# Patient Record
Sex: MALE | Race: WHITE | NOT HISPANIC OR LATINO | Employment: OTHER | URBAN - METROPOLITAN AREA
[De-identification: names, ages, dates, MRNs, and addresses within clinical notes are randomized per-mention and may not be internally consistent; named-entity substitution may affect disease eponyms.]

---

## 2018-11-26 PROCEDURE — 96366 THER/PROPH/DIAG IV INF ADDON: CPT

## 2018-11-26 PROCEDURE — 99285 EMERGENCY DEPT VISIT HI MDM: CPT | Mod: 25

## 2018-11-26 PROCEDURE — 96365 THER/PROPH/DIAG IV INF INIT: CPT

## 2018-11-26 PROCEDURE — 96375 TX/PRO/DX INJ NEW DRUG ADDON: CPT

## 2018-11-26 PROCEDURE — 99284 EMERGENCY DEPT VISIT MOD MDM: CPT | Mod: ,,, | Performed by: PHYSICIAN ASSISTANT

## 2018-11-27 ENCOUNTER — ANESTHESIA (OUTPATIENT)
Dept: ENDOSCOPY | Facility: HOSPITAL | Age: 43
End: 2018-11-27
Payer: MEDICAID

## 2018-11-27 ENCOUNTER — ANESTHESIA EVENT (OUTPATIENT)
Dept: ENDOSCOPY | Facility: HOSPITAL | Age: 43
End: 2018-11-27
Payer: MEDICAID

## 2018-11-27 ENCOUNTER — HOSPITAL ENCOUNTER (OUTPATIENT)
Facility: HOSPITAL | Age: 43
Discharge: PSYCHIATRIC OR SUBSTANCE USE FACILITY | End: 2018-11-29
Attending: EMERGENCY MEDICINE | Admitting: HOSPITALIST
Payer: MEDICAID

## 2018-11-27 DIAGNOSIS — T14.91XA SUICIDE ATTEMPT: Primary | ICD-10-CM

## 2018-11-27 DIAGNOSIS — F31.9 BIPOLAR AFFECTIVE DISORDER, REMISSION STATUS UNSPECIFIED: ICD-10-CM

## 2018-11-27 DIAGNOSIS — T54.3X2A: ICD-10-CM

## 2018-11-27 DIAGNOSIS — T30.4 CHEMICAL BURN: ICD-10-CM

## 2018-11-27 DIAGNOSIS — T50.905A ADVERSE EFFECT OF DRUG, INITIAL ENCOUNTER: ICD-10-CM

## 2018-11-27 DIAGNOSIS — F31.4 BIPOLAR I DISORDER, MOST RECENT EPISODE DEPRESSED, SEVERE WITHOUT PSYCHOTIC FEATURES: ICD-10-CM

## 2018-11-27 PROBLEM — F32.A DEPRESSION: Status: ACTIVE | Noted: 2018-11-27

## 2018-11-27 PROBLEM — S09.93XA: Status: ACTIVE | Noted: 2018-11-27

## 2018-11-27 LAB
ALBUMIN SERPL BCP-MCNC: 3.8 G/DL
ALBUMIN SERPL BCP-MCNC: 4 G/DL
ALP SERPL-CCNC: 70 U/L
ALP SERPL-CCNC: 87 U/L
ALT SERPL W/O P-5'-P-CCNC: 14 U/L
ALT SERPL W/O P-5'-P-CCNC: 15 U/L
AMPHET+METHAMPHET UR QL: NEGATIVE
ANION GAP SERPL CALC-SCNC: 10 MMOL/L
ANION GAP SERPL CALC-SCNC: 6 MMOL/L
APAP SERPL-MCNC: 4 UG/ML
AST SERPL-CCNC: 25 U/L
AST SERPL-CCNC: 28 U/L
BACTERIA #/AREA URNS AUTO: ABNORMAL /HPF
BARBITURATES UR QL SCN>200 NG/ML: NEGATIVE
BASOPHILS # BLD AUTO: 0.04 K/UL
BASOPHILS # BLD AUTO: 0.06 K/UL
BASOPHILS NFR BLD: 0.2 %
BASOPHILS NFR BLD: 0.8 %
BENZODIAZ UR QL SCN>200 NG/ML: NORMAL
BILIRUB SERPL-MCNC: 0.3 MG/DL
BILIRUB SERPL-MCNC: 0.4 MG/DL
BILIRUB UR QL STRIP: ABNORMAL
BUN SERPL-MCNC: 23 MG/DL
BUN SERPL-MCNC: 26 MG/DL
BZE UR QL SCN: NEGATIVE
CALCIUM SERPL-MCNC: 9.3 MG/DL
CALCIUM SERPL-MCNC: 9.5 MG/DL
CANNABINOIDS UR QL SCN: NEGATIVE
CHLORIDE SERPL-SCNC: 106 MMOL/L
CHLORIDE SERPL-SCNC: 108 MMOL/L
CLARITY UR REFRACT.AUTO: ABNORMAL
CO2 SERPL-SCNC: 27 MMOL/L
CO2 SERPL-SCNC: 28 MMOL/L
COLOR UR AUTO: ABNORMAL
CREAT SERPL-MCNC: 1 MG/DL
CREAT SERPL-MCNC: 1.1 MG/DL
CREAT UR-MCNC: 363 MG/DL
DIFFERENTIAL METHOD: ABNORMAL
DIFFERENTIAL METHOD: ABNORMAL
EOSINOPHIL # BLD AUTO: 0.2 K/UL
EOSINOPHIL # BLD AUTO: 0.2 K/UL
EOSINOPHIL NFR BLD: 1 %
EOSINOPHIL NFR BLD: 2.8 %
ERYTHROCYTE [DISTWIDTH] IN BLOOD BY AUTOMATED COUNT: 12.8 %
ERYTHROCYTE [DISTWIDTH] IN BLOOD BY AUTOMATED COUNT: 12.8 %
EST. GFR  (AFRICAN AMERICAN): >60 ML/MIN/1.73 M^2
EST. GFR  (AFRICAN AMERICAN): >60 ML/MIN/1.73 M^2
EST. GFR  (NON AFRICAN AMERICAN): >60 ML/MIN/1.73 M^2
EST. GFR  (NON AFRICAN AMERICAN): >60 ML/MIN/1.73 M^2
ETHANOL SERPL-MCNC: <10 MG/DL
GLUCOSE SERPL-MCNC: 113 MG/DL
GLUCOSE SERPL-MCNC: 120 MG/DL
GLUCOSE UR QL STRIP: NEGATIVE
HCT VFR BLD AUTO: 41.8 %
HCT VFR BLD AUTO: 42.5 %
HGB BLD-MCNC: 14.1 G/DL
HGB BLD-MCNC: 14.2 G/DL
HGB UR QL STRIP: NEGATIVE
HYALINE CASTS UR QL AUTO: 6 /LPF
IMM GRANULOCYTES # BLD AUTO: 0.01 K/UL
IMM GRANULOCYTES # BLD AUTO: 0.06 K/UL
IMM GRANULOCYTES NFR BLD AUTO: 0.1 %
IMM GRANULOCYTES NFR BLD AUTO: 0.4 %
KETONES UR QL STRIP: NEGATIVE
LACTATE SERPL-SCNC: 1.7 MMOL/L
LEUKOCYTE ESTERASE UR QL STRIP: ABNORMAL
LITHIUM SERPL-SCNC: <0.1 MMOL/L
LYMPHOCYTES # BLD AUTO: 1.3 K/UL
LYMPHOCYTES # BLD AUTO: 2 K/UL
LYMPHOCYTES NFR BLD: 25.2 %
LYMPHOCYTES NFR BLD: 7.6 %
MAGNESIUM SERPL-MCNC: 2.3 MG/DL
MCH RBC QN AUTO: 31.1 PG
MCH RBC QN AUTO: 31.8 PG
MCHC RBC AUTO-ENTMCNC: 33.4 G/DL
MCHC RBC AUTO-ENTMCNC: 33.7 G/DL
MCV RBC AUTO: 92 FL
MCV RBC AUTO: 95 FL
METHADONE UR QL SCN>300 NG/ML: NEGATIVE
MICROSCOPIC COMMENT: ABNORMAL
MONOCYTES # BLD AUTO: 0.7 K/UL
MONOCYTES # BLD AUTO: 1.2 K/UL
MONOCYTES NFR BLD: 7.3 %
MONOCYTES NFR BLD: 8.9 %
NEUTROPHILS # BLD AUTO: 13.8 K/UL
NEUTROPHILS # BLD AUTO: 4.9 K/UL
NEUTROPHILS NFR BLD: 62.2 %
NEUTROPHILS NFR BLD: 83.5 %
NITRITE UR QL STRIP: NEGATIVE
NRBC BLD-RTO: 0 /100 WBC
NRBC BLD-RTO: 0 /100 WBC
OPIATES UR QL SCN: NORMAL
PCP UR QL SCN>25 NG/ML: NEGATIVE
PH UR STRIP: 5 [PH] (ref 5–8)
PHOSPHATE SERPL-MCNC: 3.1 MG/DL
PLATELET # BLD AUTO: 282 K/UL
PLATELET # BLD AUTO: 293 K/UL
PMV BLD AUTO: 9.6 FL
PMV BLD AUTO: 9.8 FL
POTASSIUM SERPL-SCNC: 4.1 MMOL/L
POTASSIUM SERPL-SCNC: 4.3 MMOL/L
PROT SERPL-MCNC: 6.7 G/DL
PROT SERPL-MCNC: 7 G/DL
PROT UR QL STRIP: NEGATIVE
RBC # BLD AUTO: 4.46 M/UL
RBC # BLD AUTO: 4.54 M/UL
RBC #/AREA URNS AUTO: 2 /HPF (ref 0–4)
SODIUM SERPL-SCNC: 142 MMOL/L
SODIUM SERPL-SCNC: 143 MMOL/L
SP GR UR STRIP: >=1.03 (ref 1–1.03)
SQUAMOUS #/AREA URNS AUTO: 1 /HPF
TOXICOLOGY INFORMATION: NORMAL
TSH SERPL DL<=0.005 MIU/L-ACNC: 0.95 UIU/ML
URN SPEC COLLECT METH UR: ABNORMAL
WBC # BLD AUTO: 16.53 K/UL
WBC # BLD AUTO: 7.85 K/UL
WBC #/AREA URNS AUTO: 3 /HPF (ref 0–5)

## 2018-11-27 PROCEDURE — D9220A PRA ANESTHESIA: Mod: CRNA,,, | Performed by: NURSE ANESTHETIST, CERTIFIED REGISTERED

## 2018-11-27 PROCEDURE — 99203 OFFICE O/P NEW LOW 30 MIN: CPT | Mod: ,,, | Performed by: OTOLARYNGOLOGY

## 2018-11-27 PROCEDURE — 83605 ASSAY OF LACTIC ACID: CPT

## 2018-11-27 PROCEDURE — 80320 DRUG SCREEN QUANTALCOHOLS: CPT

## 2018-11-27 PROCEDURE — 99220 PR INITIAL OBSERVATION CARE,LEVL III: CPT | Mod: ,,, | Performed by: HOSPITALIST

## 2018-11-27 PROCEDURE — 90792 PSYCH DIAG EVAL W/MED SRVCS: CPT | Mod: AF,HB,, | Performed by: PSYCHIATRY & NEUROLOGY

## 2018-11-27 PROCEDURE — G0378 HOSPITAL OBSERVATION PER HR: HCPCS

## 2018-11-27 PROCEDURE — 37000008 HC ANESTHESIA 1ST 15 MINUTES: Performed by: INTERNAL MEDICINE

## 2018-11-27 PROCEDURE — 63600175 PHARM REV CODE 636 W HCPCS: Performed by: STUDENT IN AN ORGANIZED HEALTH CARE EDUCATION/TRAINING PROGRAM

## 2018-11-27 PROCEDURE — 37000009 HC ANESTHESIA EA ADD 15 MINS: Performed by: INTERNAL MEDICINE

## 2018-11-27 PROCEDURE — 63600175 PHARM REV CODE 636 W HCPCS: Performed by: NURSE ANESTHETIST, CERTIFIED REGISTERED

## 2018-11-27 PROCEDURE — 85025 COMPLETE CBC W/AUTO DIFF WBC: CPT | Mod: 91

## 2018-11-27 PROCEDURE — 94761 N-INVAS EAR/PLS OXIMETRY MLT: CPT

## 2018-11-27 PROCEDURE — D9220A PRA ANESTHESIA: Mod: ANES,,, | Performed by: ANESTHESIOLOGY

## 2018-11-27 PROCEDURE — S0030 INJECTION, METRONIDAZOLE: HCPCS | Performed by: INTERNAL MEDICINE

## 2018-11-27 PROCEDURE — 81001 URINALYSIS AUTO W/SCOPE: CPT

## 2018-11-27 PROCEDURE — 80053 COMPREHEN METABOLIC PANEL: CPT

## 2018-11-27 PROCEDURE — 25000003 PHARM REV CODE 250: Performed by: EMERGENCY MEDICINE

## 2018-11-27 PROCEDURE — 43235 EGD DIAGNOSTIC BRUSH WASH: CPT | Performed by: INTERNAL MEDICINE

## 2018-11-27 PROCEDURE — 00731 ANES UPR GI NDSC PX NOS: CPT | Performed by: INTERNAL MEDICINE

## 2018-11-27 PROCEDURE — 25000003 PHARM REV CODE 250: Performed by: INTERNAL MEDICINE

## 2018-11-27 PROCEDURE — 43235 EGD DIAGNOSTIC BRUSH WASH: CPT | Mod: ,,, | Performed by: INTERNAL MEDICINE

## 2018-11-27 PROCEDURE — 84100 ASSAY OF PHOSPHORUS: CPT

## 2018-11-27 PROCEDURE — 25000003 PHARM REV CODE 250: Performed by: NURSE ANESTHETIST, CERTIFIED REGISTERED

## 2018-11-27 PROCEDURE — S0028 INJECTION, FAMOTIDINE, 20 MG: HCPCS | Performed by: EMERGENCY MEDICINE

## 2018-11-27 PROCEDURE — 80307 DRUG TEST PRSMV CHEM ANLYZR: CPT

## 2018-11-27 PROCEDURE — 36415 COLL VENOUS BLD VENIPUNCTURE: CPT

## 2018-11-27 PROCEDURE — 80053 COMPREHEN METABOLIC PANEL: CPT | Mod: 91

## 2018-11-27 PROCEDURE — 80178 ASSAY OF LITHIUM: CPT

## 2018-11-27 PROCEDURE — 27000221 HC OXYGEN, UP TO 24 HOURS

## 2018-11-27 PROCEDURE — 63600175 PHARM REV CODE 636 W HCPCS: Performed by: INTERNAL MEDICINE

## 2018-11-27 PROCEDURE — 84443 ASSAY THYROID STIM HORMONE: CPT

## 2018-11-27 PROCEDURE — 80329 ANALGESICS NON-OPIOID 1 OR 2: CPT

## 2018-11-27 PROCEDURE — S0077 INJECTION, CLINDAMYCIN PHOSP: HCPCS | Performed by: EMERGENCY MEDICINE

## 2018-11-27 PROCEDURE — 83735 ASSAY OF MAGNESIUM: CPT

## 2018-11-27 PROCEDURE — C9113 INJ PANTOPRAZOLE SODIUM, VIA: HCPCS | Performed by: INTERNAL MEDICINE

## 2018-11-27 PROCEDURE — 87040 BLOOD CULTURE FOR BACTERIA: CPT

## 2018-11-27 PROCEDURE — 99204 OFFICE O/P NEW MOD 45 MIN: CPT | Mod: 25,,, | Performed by: INTERNAL MEDICINE

## 2018-11-27 RX ORDER — PANTOPRAZOLE SODIUM 40 MG/10ML
40 INJECTION, POWDER, LYOPHILIZED, FOR SOLUTION INTRAVENOUS 2 TIMES DAILY
Status: DISCONTINUED | OUTPATIENT
Start: 2018-11-27 | End: 2018-11-28

## 2018-11-27 RX ORDER — KETOROLAC TROMETHAMINE 10 MG/1
10 TABLET, FILM COATED ORAL EVERY 6 HOURS
COMMUNITY
End: 2018-12-20

## 2018-11-27 RX ORDER — CIPROFLOXACIN 2 MG/ML
400 INJECTION, SOLUTION INTRAVENOUS
Status: DISCONTINUED | OUTPATIENT
Start: 2018-11-27 | End: 2018-11-28

## 2018-11-27 RX ORDER — GLYCOPYRROLATE 0.2 MG/ML
INJECTION INTRAMUSCULAR; INTRAVENOUS
Status: DISCONTINUED | OUTPATIENT
Start: 2018-11-27 | End: 2018-11-27

## 2018-11-27 RX ORDER — CLINDAMYCIN PHOSPHATE 600 MG/50ML
600 INJECTION, SOLUTION INTRAVENOUS
Status: COMPLETED | OUTPATIENT
Start: 2018-11-27 | End: 2018-11-27

## 2018-11-27 RX ORDER — SODIUM CHLORIDE 0.9 % (FLUSH) 0.9 %
3 SYRINGE (ML) INJECTION
Status: DISCONTINUED | OUTPATIENT
Start: 2018-11-27 | End: 2018-11-27 | Stop reason: HOSPADM

## 2018-11-27 RX ORDER — METRONIDAZOLE 500 MG/100ML
500 INJECTION, SOLUTION INTRAVENOUS
Status: DISCONTINUED | OUTPATIENT
Start: 2018-11-27 | End: 2018-11-28

## 2018-11-27 RX ORDER — PROPOFOL 10 MG/ML
VIAL (ML) INTRAVENOUS
Status: DISCONTINUED | OUTPATIENT
Start: 2018-11-27 | End: 2018-11-27

## 2018-11-27 RX ORDER — PANTOPRAZOLE SODIUM 40 MG/10ML
80 INJECTION, POWDER, LYOPHILIZED, FOR SOLUTION INTRAVENOUS ONCE
Status: COMPLETED | OUTPATIENT
Start: 2018-11-27 | End: 2018-11-27

## 2018-11-27 RX ORDER — ONDANSETRON HYDROCHLORIDE 2 MG/ML
INJECTION, SOLUTION INTRAMUSCULAR; INTRAVENOUS
Status: DISCONTINUED | OUTPATIENT
Start: 2018-11-27 | End: 2018-11-27

## 2018-11-27 RX ORDER — ENOXAPARIN SODIUM 100 MG/ML
40 INJECTION SUBCUTANEOUS EVERY 24 HOURS
Status: DISCONTINUED | OUTPATIENT
Start: 2018-11-27 | End: 2018-11-29 | Stop reason: HOSPADM

## 2018-11-27 RX ORDER — FENTANYL CITRATE 50 UG/ML
INJECTION, SOLUTION INTRAMUSCULAR; INTRAVENOUS
Status: DISCONTINUED | OUTPATIENT
Start: 2018-11-27 | End: 2018-11-27

## 2018-11-27 RX ORDER — MIDAZOLAM HYDROCHLORIDE 1 MG/ML
INJECTION INTRAMUSCULAR; INTRAVENOUS
Status: DISCONTINUED | OUTPATIENT
Start: 2018-11-27 | End: 2018-11-27

## 2018-11-27 RX ORDER — ONDANSETRON 2 MG/ML
4 INJECTION INTRAMUSCULAR; INTRAVENOUS ONCE AS NEEDED
Status: DISCONTINUED | OUTPATIENT
Start: 2018-11-27 | End: 2018-11-27 | Stop reason: HOSPADM

## 2018-11-27 RX ORDER — ONDANSETRON 2 MG/ML
4 INJECTION INTRAMUSCULAR; INTRAVENOUS EVERY 6 HOURS PRN
Status: DISCONTINUED | OUTPATIENT
Start: 2018-11-27 | End: 2018-11-29 | Stop reason: HOSPADM

## 2018-11-27 RX ORDER — DEXAMETHASONE SODIUM PHOSPHATE 4 MG/ML
INJECTION, SOLUTION INTRA-ARTICULAR; INTRALESIONAL; INTRAMUSCULAR; INTRAVENOUS; SOFT TISSUE
Status: DISCONTINUED | OUTPATIENT
Start: 2018-11-27 | End: 2018-11-27

## 2018-11-27 RX ORDER — SUCCINYLCHOLINE CHLORIDE 20 MG/ML
INJECTION INTRAMUSCULAR; INTRAVENOUS
Status: DISCONTINUED | OUTPATIENT
Start: 2018-11-27 | End: 2018-11-27

## 2018-11-27 RX ORDER — SODIUM CHLORIDE 9 MG/ML
INJECTION, SOLUTION INTRAVENOUS CONTINUOUS PRN
Status: DISCONTINUED | OUTPATIENT
Start: 2018-11-27 | End: 2018-11-27

## 2018-11-27 RX ORDER — LIDOCAINE HCL/PF 100 MG/5ML
SYRINGE (ML) INTRAVENOUS
Status: DISCONTINUED | OUTPATIENT
Start: 2018-11-27 | End: 2018-11-27

## 2018-11-27 RX ORDER — FAMOTIDINE 10 MG/ML
20 INJECTION INTRAVENOUS
Status: COMPLETED | OUTPATIENT
Start: 2018-11-27 | End: 2018-11-27

## 2018-11-27 RX ORDER — MORPHINE SULFATE 4 MG/ML
1 INJECTION, SOLUTION INTRAMUSCULAR; INTRAVENOUS EVERY 4 HOURS PRN
Status: DISCONTINUED | OUTPATIENT
Start: 2018-11-27 | End: 2018-11-28

## 2018-11-27 RX ADMIN — PANTOPRAZOLE SODIUM 80 MG: 40 INJECTION, POWDER, FOR SOLUTION INTRAVENOUS at 09:11

## 2018-11-27 RX ADMIN — CIPROFLOXACIN 400 MG: 2 INJECTION, SOLUTION INTRAVENOUS at 10:11

## 2018-11-27 RX ADMIN — MORPHINE SULFATE 1 MG: 4 INJECTION, SOLUTION INTRAMUSCULAR; INTRAVENOUS at 08:11

## 2018-11-27 RX ADMIN — CLINDAMYCIN IN 5 PERCENT DEXTROSE 600 MG: 12 INJECTION, SOLUTION INTRAVENOUS at 03:11

## 2018-11-27 RX ADMIN — ONDANSETRON 4 MG: 2 INJECTION, SOLUTION INTRAMUSCULAR; INTRAVENOUS at 01:11

## 2018-11-27 RX ADMIN — GLYCOPYRROLATE 0.2 MG: 0.2 INJECTION, SOLUTION INTRAMUSCULAR; INTRAVENOUS at 01:11

## 2018-11-27 RX ADMIN — MORPHINE SULFATE 1 MG: 4 INJECTION, SOLUTION INTRAMUSCULAR; INTRAVENOUS at 10:11

## 2018-11-27 RX ADMIN — FAMOTIDINE 20 MG: 10 INJECTION, SOLUTION INTRAVENOUS at 04:11

## 2018-11-27 RX ADMIN — DEXAMETHASONE SODIUM PHOSPHATE 12 MG: 4 INJECTION, SOLUTION INTRAMUSCULAR; INTRAVENOUS at 01:11

## 2018-11-27 RX ADMIN — SODIUM CHLORIDE: 0.9 INJECTION, SOLUTION INTRAVENOUS at 01:11

## 2018-11-27 RX ADMIN — PROPOFOL 220 MG: 10 INJECTION, EMULSION INTRAVENOUS at 01:11

## 2018-11-27 RX ADMIN — SODIUM CHLORIDE, SODIUM LACTATE, POTASSIUM CHLORIDE, AND CALCIUM CHLORIDE 1000 ML: .6; .31; .03; .02 INJECTION, SOLUTION INTRAVENOUS at 08:11

## 2018-11-27 RX ADMIN — PANTOPRAZOLE SODIUM 40 MG: 40 INJECTION, POWDER, FOR SOLUTION INTRAVENOUS at 08:11

## 2018-11-27 RX ADMIN — METRONIDAZOLE 500 MG: 500 INJECTION, SOLUTION INTRAVENOUS at 05:11

## 2018-11-27 RX ADMIN — LIDOCAINE HYDROCHLORIDE 75 MG: 20 INJECTION, SOLUTION INTRAVENOUS at 01:11

## 2018-11-27 RX ADMIN — FENTANYL CITRATE 50 MCG: 50 INJECTION, SOLUTION INTRAMUSCULAR; INTRAVENOUS at 01:11

## 2018-11-27 RX ADMIN — ENOXAPARIN SODIUM 40 MG: 100 INJECTION SUBCUTANEOUS at 05:11

## 2018-11-27 RX ADMIN — SUCCINYLCHOLINE CHLORIDE 120 MG: 20 INJECTION, SOLUTION INTRAMUSCULAR; INTRAVENOUS at 01:11

## 2018-11-27 RX ADMIN — MIDAZOLAM HYDROCHLORIDE 2 MG: 1 INJECTION, SOLUTION INTRAMUSCULAR; INTRAVENOUS at 01:11

## 2018-11-27 RX ADMIN — CIPROFLOXACIN 400 MG: 2 INJECTION, SOLUTION INTRAVENOUS at 08:11

## 2018-11-27 RX ADMIN — METRONIDAZOLE 500 MG: 500 INJECTION, SOLUTION INTRAVENOUS at 12:11
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
## 2018-11-27 NOTE — ASSESSMENT & PLAN NOTE
43 y/o M with questionable h/o bipolar disorder who presented after ingestion of  in apparent suicidal gesture. Pt ingested small amount of  after argument with wife. I do not think that this was a legitimate suicide attempt as the amount ingested was very small as evidenced by lack of burns past anterior 2/3 of tongue. I suspect that this was an attempt to get his wife's attention by making a suicidal gesture. Pt appears depressed though he does not meet criteria for major depressive disorder. It would be reasonable to continue PEC at this time until situation can be discussed with wife. Furthermore, the patient does not appear to have ever experienced a manic episode, which is required for the diagnosis of bipolar disorder.     · Start Lexapro 10 mg PO daily  · Continue PEC    Will continue to follow.
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
## 2018-11-27 NOTE — PLAN OF CARE
Pt sleeping ,      11/27/18 1110   Discharge Assessment   Assessment Type Discharge Planning Assessment   Confirmed/corrected address and phone number on facesheet? No   [place] PT'S LEG  FRANCIA'd  Sitter at [employer]
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
## 2018-11-27 NOTE — SUBJECTIVE & OBJECTIVE
History reviewed. No pertinent past medical history.    Past Surgical History:   Procedure Laterality Date    ABDOMINAL SURGERY         Review of patient's allergies indicates:   Allergen Reactions    Pcn [penicillins] Hives    Propranolol      Family History     None        Tobacco Use    Smoking status: Current Every Day Smoker     Packs/day: 1.00     Types: Cigarettes   Substance and Sexual Activity    Alcohol use: Yes     Comment: social    Drug use: No    Sexual activity: Yes     Partners: Female     Review of Systems   Constitutional: Negative for activity change, appetite change and fever.   Eyes: Negative for visual disturbance.   Respiratory: Negative for cough and shortness of breath.    Cardiovascular: Negative for chest pain.   Gastrointestinal: Negative for abdominal distention, abdominal pain, anal bleeding, blood in stool, constipation, diarrhea, nausea, rectal pain and vomiting.   Genitourinary: Negative for flank pain.   Musculoskeletal: Negative for arthralgias and back pain.   Skin: Negative for color change.   Allergic/Immunologic: Negative for immunocompromised state.   Psychiatric/Behavioral: Negative for confusion.     Objective:     Vital Signs (Most Recent):  Temp: (!) 100.9 °F (38.3 °C) (11/27/18 0756)  Pulse: 80 (11/27/18 0756)  Resp: 16 (11/27/18 0756)  BP: 115/64 (11/27/18 0756)  SpO2: 99 % (11/27/18 0756) Vital Signs (24h Range):  Temp:  [97.6 °F (36.4 °C)-100.9 °F (38.3 °C)] 100.9 °F (38.3 °C)  Pulse:  [76-88] 80  Resp:  [16-18] 16  SpO2:  [99 %-100 %] 99 %  BP: (115-140)/(59-90) 115/64     Weight: 79.4 kg (175 lb) (11/26/18 2357)  Body mass index is 22.47 kg/m².      Intake/Output Summary (Last 24 hours) at 11/27/2018 1127  Last data filed at 11/27/2018 0505  Gross per 24 hour   Intake 50 ml   Output --   Net 50 ml       Lines/Drains/Airways     Peripheral Intravenous Line                 Peripheral IV - Single Lumen 11/27/18 Anterior;Distal;Right Antecubital less than 1 day                 Physical Exam   Constitutional: He is oriented to person, place, and time. He appears well-developed. No distress.   HENT:   Head: Normocephalic.   Oral mucosal and tongue ulcerations, posterior pharynx appears normal.   Eyes: Pupils are equal, round, and reactive to light. No scleral icterus.   Neck: Normal range of motion. Neck supple.   Cardiovascular: Normal rate and regular rhythm.   Pulmonary/Chest: Effort normal and breath sounds normal.   Abdominal: Soft. Bowel sounds are normal. He exhibits no distension.   Musculoskeletal: Normal range of motion.   Neurological: He is alert and oriented to person, place, and time.   Skin: Skin is warm and dry.   Psychiatric: He has a normal mood and affect.       Significant Labs:  CBC:   Recent Labs   Lab 11/27/18  0115 11/27/18  0755   WBC 7.85 16.53*   HGB 14.2 14.1   HCT 42.5 41.8    282     BMP:   Recent Labs   Lab 11/27/18  0755   *      K 4.3      CO2 28   BUN 23*   CREATININE 1.0   CALCIUM 9.3   MG 2.3     CMP:   Recent Labs   Lab 11/27/18  0755   *   CALCIUM 9.3   ALBUMIN 3.8   PROT 6.7      K 4.3   CO2 28      BUN 23*   CREATININE 1.0   ALKPHOS 70   ALT 14   AST 25   BILITOT 0.4     Coagulation: No results for input(s): PT, INR, APTT in the last 48 hours.  Lipase: No results for input(s): LIPASE in the last 48 hours.    Significant Imaging:  Imaging results within the past 24 hours have been reviewed.

## 2018-11-27 NOTE — ANESTHESIA POSTPROCEDURE EVALUATION
"Anesthesia Post Evaluation    Patient: Demarco Ba    Procedure(s) Performed: Procedure(s) (LRB):  EGD (ESOPHAGOGASTRODUODENOSCOPY) (N/A)    Final Anesthesia Type: general  Patient location during evaluation: PACU  Patient participation: Yes- Able to Participate  Level of consciousness: awake and alert and oriented  Post-procedure vital signs: reviewed and stable  Pain management: adequate  Airway patency: patent  PONV status at discharge: No PONV  Anesthetic complications: no      Cardiovascular status: blood pressure returned to baseline and hemodynamically stable  Respiratory status: unassisted, room air and spontaneous ventilation  Hydration status: euvolemic  Follow-up not needed.        Visit Vitals  /62   Pulse 78   Temp 37.3 °C (99.1 °F) (Temporal)   Resp 15   Ht 6' 2" (1.88 m)   Wt 79.4 kg (175 lb)   SpO2 100%   BMI 22.47 kg/m²       Pain/Sussy Score: Pain Assessment Performed: Yes (11/27/2018  2:09 PM)  Presence of Pain: denies (11/27/2018  2:09 PM)  Pain Rating Prior to Med Admin: 8 (11/27/2018 10:03 AM)  Sussy Score: 8 (11/27/2018  2:09 PM)        "
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
## 2018-11-27 NOTE — ED NOTES
Patient placed in bed 16, while nurse was completing IV insertion patient begin to get loud  And irate and curse at nurse, calling the nurse names, and curse.  Patient encouraged that type of behavior was not needed and nurse attempted to redirect patient.

## 2018-11-27 NOTE — PHARMACY MED REC
"Admission Medication Reconciliation - Pharmacy Consult Note    The home medication history was taken by Kaycee Pan, Pharmacy Technician.  Based on information gathered and subsequent review by the clinical pharmacist, the items below may need attention.    You may go to "Admission" then "Reconcile Home Medications" tabs to review and/or act upon these items.    No issues noted with the medication reconciliation.    Potential issues to be addressed PRIOR TO DISCHARGE  o Medication non-adherence    Please address this information as you see fit.  Feel free to contact us if you have any questions or require assistance.    Mady Jennings, PharmD, BCPS  [id number]     [facility] Medications   Medication Sig    ketorolac (TORADOL) 10 mg tablet Take 10 mg by mouth every 6 (six) hours.     .    .            "

## 2018-11-27 NOTE — ED NOTES
Pt transferred to the floor with a nurse, sitter, and security present. Original pec sent with pt and pt 2 belonging bags.

## 2018-11-27 NOTE — ED NOTES
Care assumed. Pt remains in paper scrubs, resting in stretcher comfortably. No signs of distress noted. Sitter remains at bedside in direct visual contact, charting per protocol every 15 minutes. No equipment or belongings are in the patients room. Pt aware of plan of care. Will continue to monitor.

## 2018-11-27 NOTE — [employer] PATIENT INSTRUCTIONS
Discharge Summary/Instructions after an Endoscopic Procedure  Patient Name: Demarco Ba  Patient MRN: [id number]  Patient YOB: 1975  Tuesday, November 27, 2018  Garret Stacy MD  RESTRICTIONS:  During your procedure today, you received medications for sedation.  These   medications may affect your judgment, balance and coordination.  Therefore,   for 24 hours, you have the following restrictions:   - DO NOT drive a car, operate machinery, make legal/financial decisions,   sign important papers or drink alcohol.    ACTIVITY:  Today: no heavy lifting, straining or running due to procedural   sedation/anesthesia.  The following day: return to full activity including work.  DIET:  Eat and drink normally unless instructed otherwise.     TREATMENT FOR COMMON SIDE EFFECTS:  - Mild abdominal pain, nausea, belching, bloating or excessive gas:  rest,   eat lightly and use a heating pad.  - Sore Throat: treat with throat lozenges and/or gargle with warm salt   water.  - Because air was used during the procedure, expelling large amounts of air   from your rectum or belching is normal.  - If a bowel prep was taken, you may not have a bowel movement for 1-3 days.    This is normal.  SYMPTOMS TO WATCH FOR AND REPORT TO YOUR PHYSICIAN:  1. Abdominal pain or bloating, other than gas cramps.  2. Chest pain.  3. Back pain.  4. Signs of infection such as: chills or fever occurring within 24 hours   after the procedure.  5. Rectal bleeding, which would show as bright red, maroon, or black stools.   (A tablespoon of blood from the rectum is not serious, especially if   hemorrhoids are present.)  6. Vomiting.  7. Weakness or dizziness.  GO DIRECTLY TO THE NEAREST EMERGENCY ROOM IF YOU HAVE ANY OF THE FOLLOWING:      Difficulty breathing              Chills and/or fever over 101 F   Persistent vomiting and/or vomiting blood   Severe abdominal pain   Severe chest pain   Black, tarry stools   Bleeding- more than one  tablespoon   Any other symptom or condition that you feel may need urgent attention  Your doctor recommends these additional instructions:  If any biopsies were taken, your doctors clinic will contact you in 1 to 2   weeks with any results.  - Return patient to [facility] for ongoing care.   - The findings and recommendations were discussed with the designated   responsible adult.   - ENT recs for oropharyngeal damage  For questions, problems or results please call your physician - Garret Stacy MD at Work:  [phone].  [facility], EMERGENCY ROOM PHONE NUMBER: [phone]  IF A COMPLICATION OR EMERGENCY SITUATION ARISES AND YOU ARE UNABLE TO REACH   YOUR PHYSICIAN - GO DIRECTLY TO THE EMERGENCY ROOM.  Garret Stacy MD  11/27/2018 2:03:02 PM  This report has been verified and signed electronically.  [employer]

## 2018-11-27 NOTE — PSYCH
Psych Nursing Safety Rounds done on pt today.No safety issues found, Sitter at bedside.1:1 Charting 15 min flowsheet.PEC in pt chart.?

## 2018-11-27 NOTE — SUBJECTIVE & OBJECTIVE
Medications:  Continuous Infusions:  Scheduled Meds:   pantoprazole  40 mg Intravenous BID    pantoprazole  80 mg Intravenous Once     PRN Meds:     No current facility-administered medications on file prior to encounter.      Current Outpatient Medications on File Prior to Encounter   Medication Sig    ketorolac (TORADOL) 10 mg tablet Take 10 mg by mouth every 6 (six) hours.    naproxen (NAPROSYN) 500 MG tablet Take 1 tablet (500 mg total) by mouth 2 (two) times daily with meals.       Review of patient's allergies indicates:   Allergen Reactions    Pcn [penicillins] Hives    Propranolol        History reviewed. No pertinent past medical history.  Past Surgical History:   Procedure Laterality Date    ABDOMINAL SURGERY       Family History     None        Tobacco Use    Smoking status: Current Every Day Smoker     Packs/day: 1.00     Types: Cigarettes   Substance and Sexual Activity    Alcohol use: Yes     Comment: social    Drug use: No    Sexual activity: Yes     Partners: Female     Review of Systems  Objective:     Vital Signs (Most Recent):  Temp: 97.6 °F (36.4 °C) (11/27/18 0652)  Pulse: 76 (11/27/18 0652)  Resp: 18 (11/26/18 2357)  BP: (!) 125/59 (11/27/18 0652)  SpO2: 99 % (11/27/18 0652) Vital Signs (24h Range):  Temp:  [97.6 °F (36.4 °C)-98.5 °F (36.9 °C)] 97.6 °F (36.4 °C)  Pulse:  [76-88] 76  Resp:  [18] 18  SpO2:  [99 %-100 %] 99 %  BP: (125-140)/(59-90) 125/59     Weight: 79.4 kg (175 lb)  Body mass index is 22.47 kg/m².         Physical Exam  Physical Exam    Vitals:    11/27/18 0652   BP: (!) 125/59   Pulse: 76   Resp:    Temp: 97.6 °F (36.4 °C)       General: AAOx3, NAD.  Right Ear: External Auditory Canal WNL,TM without effusion  Left Ear:  External Auditory Canal WNL,TM without effusion  Nose: No gross nasal septal deviation.  Inferior Turbinates WNL bilaterally.  No septal perforation or hematomas  No masses/lesions.  Oral Cavity: diffuse desquamating ulcerations about the tongue and  gingiva. Diffuse petechial hemorrhage about the tongue and gingiva. Lower lip swelling present. Minor tongue swelling  Oropharynx: BOT with some edema, L>R.  No masses/lesions noted. Tonsillar fossa without lesions. Soft palate without masses. Midline uvula.  Neck: No palpable lymphadenopathy at levels I - VI. Full ROM. No thyroid nodules palpated.  Face: HB I bilaterally. Normal sensation.  Eyes: EOM intact bilaterally, PERRLA bilaterally. No exophthalmos/enopthalmos.  Neuro: CN II-XII grossly intact    FFL    Routine preparation with local atomizer with 1% neosynephrine/lidocaine with customary flexible endoscope.      Nasopharynx: No lesions. No abnormality of adenoids.  Posterior Choanae: Patent bilaterally.  Eustachian Tubes: Patent bilaterally.  Posterior pharynx: No lesions.   Larynx/hypopharynx: There is no edema at the level of the epiglottis. The vocal cords are mobile. The airway is widely patent. There is some swelling present at the base of tongue with some ulceration, this appears to the be the furthest extent of ingestion.   Epiglottis: No lesions, without edema.    AE Folds: No lesions.    Vocal cords: No polyps, nodules, ulcers or lesions.    Mobility: Equal and normal bilateral.    Hypopharynx: No lesions.    Piriform sinus: No pooling, no lesions.    Post Cricoid: No erythema    Significant Labs:  CBC:   Recent Labs   Lab 11/27/18 0115   WBC 7.85   RBC 4.46*   HGB 14.2   HCT 42.5      MCV 95   MCH 31.8*   MCHC 33.4     CMP:   Recent Labs   Lab 11/27/18 0115   *   CALCIUM 9.5   ALBUMIN 4.0   PROT 7.0      K 4.1   CO2 27      BUN 26*   CREATININE 1.1   ALKPHOS 87   ALT 15   AST 28   BILITOT 0.3       Significant Diagnostics:  None
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
## 2018-11-27 NOTE — NURSING TRANSFER
Nursing Transfer Note      11/27/2018     Transfer To: 1148    Transfer via stretcher    Transfer with none    Transported by PCT     Medicines sent: None    Chart send with patient: Yes    Notified: pt sitter    Patient reassessed at: 11/27/18 1500    Upon arrival to floor: patient oriented to room, call bell in reach and bed in lowest position
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
## 2018-11-27 NOTE — ED NOTES
Patient resting in bed, he is not in any apparent distress,and has no complaints at this time.  His bed is in lowest position and both side rails are up.  Sitter is completing 1:1 rounds bedside and the door is open. Will continue to monitor. Spoke with Ruby from [employer], who called for update on patient status.

## 2018-11-27 NOTE — PROGRESS NOTES
Ilda in corners office made aware of PEC and patient arrival time to unit. Made aware that ER had faxed copy of PEC prior and that second fax was not needed.

## 2018-11-27 NOTE — TRANSFER OF CARE
"Anesthesia Transfer of Care Note    Patient: Demarco Ba    Procedure(s) Performed: Procedure(s) (LRB):  EGD (ESOPHAGOGASTRODUODENOSCOPY) (N/A)    Patient location: PACU    Anesthesia Type: general    Transport from OR: Transported from OR on 6-10 L/min O2 by face mask with adequate spontaneous ventilation    Post pain: adequate analgesia    Post assessment: no apparent anesthetic complications and tolerated procedure well    Post vital signs: stable    Level of consciousness: awake and responds to stimulation    Nausea/Vomiting: no nausea/vomiting    Complications: none    Transfer of care protocol was followed      Last vitals:   Visit Vitals  /73 (BP Location: Left arm, Patient Position: Lying)   Pulse 82   Temp 36.6 °C (97.8 °F) (Temporal)   Resp 18   Ht 6' 2" (1.88 m)   Wt 79.4 kg (175 lb)   SpO2 97%   BMI 22.47 kg/m²     "

## 2018-11-27 NOTE — ED NOTES
Patient resting in chair with wife at his side, he is calm, currently in no acute distress.  He has no
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
## 2018-11-28 PROBLEM — F31.9 BIPOLAR DISORDER: Status: ACTIVE | Noted: 2018-11-27

## 2018-11-28 LAB
ALBUMIN SERPL BCP-MCNC: 3.5 G/DL
ALP SERPL-CCNC: 61 U/L
ALT SERPL W/O P-5'-P-CCNC: 11 U/L
ANION GAP SERPL CALC-SCNC: 7 MMOL/L
AST SERPL-CCNC: 17 U/L
BASOPHILS # BLD AUTO: 0.03 K/UL
BASOPHILS NFR BLD: 0.2 %
BILIRUB SERPL-MCNC: 1.1 MG/DL
BUN SERPL-MCNC: 17 MG/DL
CALCIUM SERPL-MCNC: 9.4 MG/DL
CHLORIDE SERPL-SCNC: 106 MMOL/L
CO2 SERPL-SCNC: 26 MMOL/L
CREAT SERPL-MCNC: 0.9 MG/DL
DIFFERENTIAL METHOD: ABNORMAL
EOSINOPHIL # BLD AUTO: 0 K/UL
EOSINOPHIL NFR BLD: 0 %
ERYTHROCYTE [DISTWIDTH] IN BLOOD BY AUTOMATED COUNT: 12.8 %
EST. GFR  (AFRICAN AMERICAN): >60 ML/MIN/1.73 M^2
EST. GFR  (NON AFRICAN AMERICAN): >60 ML/MIN/1.73 M^2
GLUCOSE SERPL-MCNC: 127 MG/DL
HCT VFR BLD AUTO: 41.7 %
HGB BLD-MCNC: 14 G/DL
IMM GRANULOCYTES # BLD AUTO: 0.08 K/UL
IMM GRANULOCYTES NFR BLD AUTO: 0.4 %
LYMPHOCYTES # BLD AUTO: 1.6 K/UL
LYMPHOCYTES NFR BLD: 8.6 %
MAGNESIUM SERPL-MCNC: 2 MG/DL
MCH RBC QN AUTO: 31.5 PG
MCHC RBC AUTO-ENTMCNC: 33.6 G/DL
MCV RBC AUTO: 94 FL
MONOCYTES # BLD AUTO: 1.5 K/UL
MONOCYTES NFR BLD: 7.7 %
NEUTROPHILS # BLD AUTO: 15.8 K/UL
NEUTROPHILS NFR BLD: 83.1 %
NRBC BLD-RTO: 0 /100 WBC
PHOSPHATE SERPL-MCNC: 3.1 MG/DL
PLATELET # BLD AUTO: 268 K/UL
PMV BLD AUTO: 10.1 FL
POTASSIUM SERPL-SCNC: 4.2 MMOL/L
PROT SERPL-MCNC: 6.5 G/DL
RBC # BLD AUTO: 4.44 M/UL
SODIUM SERPL-SCNC: 139 MMOL/L
WBC # BLD AUTO: 19.02 K/UL

## 2018-11-28 PROCEDURE — 63600175 PHARM REV CODE 636 W HCPCS: Performed by: STUDENT IN AN ORGANIZED HEALTH CARE EDUCATION/TRAINING PROGRAM

## 2018-11-28 PROCEDURE — G8997 SWALLOW GOAL STATUS: HCPCS | Mod: CH

## 2018-11-28 PROCEDURE — 80053 COMPREHEN METABOLIC PANEL: CPT

## 2018-11-28 PROCEDURE — G0378 HOSPITAL OBSERVATION PER HR: HCPCS

## 2018-11-28 PROCEDURE — 25000003 PHARM REV CODE 250: Performed by: STUDENT IN AN ORGANIZED HEALTH CARE EDUCATION/TRAINING PROGRAM

## 2018-11-28 PROCEDURE — G8996 SWALLOW CURRENT STATUS: HCPCS | Mod: CL

## 2018-11-28 PROCEDURE — 83735 ASSAY OF MAGNESIUM: CPT

## 2018-11-28 PROCEDURE — 92610 EVALUATE SWALLOWING FUNCTION: CPT

## 2018-11-28 PROCEDURE — 85025 COMPLETE CBC W/AUTO DIFF WBC: CPT

## 2018-11-28 PROCEDURE — 63600175 PHARM REV CODE 636 W HCPCS

## 2018-11-28 PROCEDURE — 25000003 PHARM REV CODE 250: Performed by: INTERNAL MEDICINE

## 2018-11-28 PROCEDURE — 99226 PR SUBSEQUENT OBSERVATION CARE,LEVEL III: CPT | Mod: ,,, | Performed by: HOSPITALIST

## 2018-11-28 PROCEDURE — 63600175 PHARM REV CODE 636 W HCPCS: Performed by: INTERNAL MEDICINE

## 2018-11-28 PROCEDURE — 36415 COLL VENOUS BLD VENIPUNCTURE: CPT

## 2018-11-28 PROCEDURE — C9113 INJ PANTOPRAZOLE SODIUM, VIA: HCPCS | Performed by: INTERNAL MEDICINE

## 2018-11-28 PROCEDURE — S0030 INJECTION, METRONIDAZOLE: HCPCS | Performed by: INTERNAL MEDICINE

## 2018-11-28 PROCEDURE — 84100 ASSAY OF PHOSPHORUS: CPT

## 2018-11-28 RX ORDER — CLINDAMYCIN HYDROCHLORIDE 150 MG/1
450 CAPSULE ORAL EVERY 6 HOURS
Status: DISCONTINUED | OUTPATIENT
Start: 2018-11-28 | End: 2018-11-29

## 2018-11-28 RX ORDER — ARIPIPRAZOLE 5 MG/1
5 TABLET ORAL DAILY
Status: DISCONTINUED | OUTPATIENT
Start: 2018-11-28 | End: 2018-11-29

## 2018-11-28 RX ORDER — HYDROMORPHONE HYDROCHLORIDE 1 MG/ML
0.5 INJECTION, SOLUTION INTRAMUSCULAR; INTRAVENOUS; SUBCUTANEOUS EVERY 6 HOURS PRN
Status: DISCONTINUED | OUTPATIENT
Start: 2018-11-28 | End: 2018-11-29

## 2018-11-28 RX ORDER — HYDROMORPHONE HYDROCHLORIDE 2 MG/ML
INJECTION, SOLUTION INTRAMUSCULAR; INTRAVENOUS; SUBCUTANEOUS
Status: COMPLETED
Start: 2018-11-28 | End: 2018-11-28

## 2018-11-28 RX ORDER — PANTOPRAZOLE SODIUM 40 MG/1
40 TABLET, DELAYED RELEASE ORAL DAILY
Status: DISCONTINUED | OUTPATIENT
Start: 2018-11-29 | End: 2018-11-29 | Stop reason: HOSPADM

## 2018-11-28 RX ORDER — OXYCODONE HYDROCHLORIDE 5 MG/1
5 TABLET ORAL EVERY 6 HOURS PRN
Status: DISCONTINUED | OUTPATIENT
Start: 2018-11-28 | End: 2018-11-29

## 2018-11-28 RX ADMIN — MORPHINE SULFATE 1 MG: 4 INJECTION, SOLUTION INTRAMUSCULAR; INTRAVENOUS at 08:11

## 2018-11-28 RX ADMIN — CLINDAMYCIN HYDROCHLORIDE 450 MG: 150 CAPSULE ORAL at 06:11

## 2018-11-28 RX ADMIN — HYDROMORPHONE HYDROCHLORIDE 0.5 MG: 2 INJECTION INTRAMUSCULAR; INTRAVENOUS; SUBCUTANEOUS at 06:11

## 2018-11-28 RX ADMIN — CLINDAMYCIN HYDROCHLORIDE 450 MG: 150 CAPSULE ORAL at 11:11

## 2018-11-28 RX ADMIN — MORPHINE SULFATE 1 MG: 4 INJECTION, SOLUTION INTRAMUSCULAR; INTRAVENOUS at 03:11

## 2018-11-28 RX ADMIN — METRONIDAZOLE 500 MG: 500 INJECTION, SOLUTION INTRAVENOUS at 12:11

## 2018-11-28 RX ADMIN — MORPHINE SULFATE 1 MG: 4 INJECTION, SOLUTION INTRAMUSCULAR; INTRAVENOUS at 02:11

## 2018-11-28 RX ADMIN — CIPROFLOXACIN 400 MG: 2 INJECTION, SOLUTION INTRAVENOUS at 08:11

## 2018-11-28 RX ADMIN — ARIPIPRAZOLE 5 MG: 5 TABLET ORAL at 04:11

## 2018-11-28 RX ADMIN — METRONIDAZOLE 500 MG: 500 INJECTION, SOLUTION INTRAVENOUS at 10:11

## 2018-11-28 RX ADMIN — PANTOPRAZOLE SODIUM 40 MG: 40 INJECTION, POWDER, FOR SOLUTION INTRAVENOUS at 08:11

## 2018-11-28 RX ADMIN — ENOXAPARIN SODIUM 40 MG: 100 INJECTION SUBCUTANEOUS at 04:11
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
## 2018-11-28 NOTE — PSYCH
Psychiatric safety rounds completed this shift. No safety issues found. Sitter at bedside.Sitter has 15 min flow sheet and reports charting will bed completed. PEC/CEC on chart.

## 2018-11-28 NOTE — DISCHARGE INSTRUCTIONS
[facility], [facility] in [place], [place]  Address: [address], [place] [address]  Hours:   Open ? Closes 4:30PM  Phone: [phone]

## 2018-11-28 NOTE — PT/OT/SLP EVAL
"Speech Language Pathology Evaluation  Bedside Swallow    Patient Name:  Demarco Ba   MRN:  [id number]  Admitting Diagnosis: Lye ingestion, intentional self-harm, initial encounter    Recommendations:                 General Recommendations:  Dysphagia therapy  Diet recommendations:  Puree, Thin   Aspiration Precautions: 1 bite/sip at a time, HOB to 90 degrees, Meds whole 1 at a time, Small bites/sips and Strict aspiration precautions   Recs d/w team  General Precautions: Standard, pureed diet      History:     History reviewed. No pertinent past medical history.    Past Surgical History:   Procedure Laterality Date    ABDOMINAL SURGERY         Prior diet: regular/thin    Subjective   Awake & alert. [facility] sitter at bedside. Pt concerned about loosing lower dentition. "I'm so hungry."    Pain/Comfort:  · Pain Rating 1: 8/10  · Location 1: throat  · Pain Addressed 2: Distraction  · Pain Rating Post-Intervention 2: 8/10    Objective:     Oral Musculature Evaluation  · Oral Musculature: (limited range of motion 2/2 oral wounds, omar swollen)  · Dentition: (lower gums blackened 2/2 trauma )  · Secretion Management: adequate  · Mucosal Quality: (lips with dried blood)  · Oral Labial Strength and Mobility: impaired retraction, impaired pursing, impaired seal  · Lingual Strength and Mobility: impaired strength, impaired depression, impaired protrusion, impaired anterior elevation, impaired left lateral movement, impaired right lateral movement  · Volitional Cough: adequate  · Volitional Swallow: adequate  · Voice Prior to PO Intake: clear    Bedside Swallow Eval:   Consistencies Assessed:  · Thin liquids ice chip x1, via spoon x1, via cup sips x3, via straw sips x3  · Puree 1 tsp bites x4   · Pt deferred solid trial 2/2 oral cavity sore/swollen    Oral Phase:   · Slow oral transit time   · Initially, pt extended head back to move bolus A-P 2/2 poor lingual range of motion    Pharyngeal Phase:   · no overt clinical " signs/symptoms of aspiration    Assessment:     Demarco Ba is a 42 y.o. male with an SLP diagnosis of oral Dysphagia s/p oral trauma    Goals:   Multidisciplinary Problems     SLP Goals        Problem: SLP Goal    Goal Priority Disciplines Outcome   SLP Goal     SLP    Description:  Speech Language Pathology Goals  Goals expected to be met by 12/5  1. Pt will tolerate puree & thin liquids without s/s aspiration & adequate oral phase of swallow  2. Pt will tolerate trials of solids to determine when ready to advance diet                    Plan:     · Patient to be seen:  4 x/week   · Plan of Care expires:  12/27/18  · Plan of Care reviewed with:  patient([id number] sitter at bedside)   · SLP Follow-Up:  Yes       Discharge recommendations:  (tbd)     Time Tracking:     SLP Treatment Date:   11/28/18  Speech Start Time:  0852  Speech Stop Time:  0905     Speech Total Time (min):  13 min    Billable Minutes: Eval Swallow and Oral Function 13    Winnie Tariq CCC-SLP  11/28/2018
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
## 2018-11-28 NOTE — SUBJECTIVE & OBJECTIVE
History reviewed. No pertinent past medical history.    Past Surgical History:   Procedure Laterality Date    ABDOMINAL SURGERY         Review of patient's allergies indicates:   Allergen Reactions    Pcn [penicillins] Hives    Propranolol        No current facility-administered medications on file prior to encounter.      Current Outpatient Medications on File Prior to Encounter   Medication Sig    ketorolac (TORADOL) 10 mg tablet Take 10 mg by mouth every 6 (six) hours.    [DISCONTINUED] naproxen (NAPROSYN) 500 MG tablet Take 1 tablet (500 mg total) by mouth 2 (two) times daily with meals.     Family History     None        Tobacco Use    Smoking status: Current Every Day Smoker     Packs/day: 1.00     Types: Cigarettes   Substance and Sexual Activity    Alcohol use: Yes     Comment: social    Drug use: No    Sexual activity: Yes     Partners: Female     Review of Systems   Constitutional: Positive for fever. Negative for activity change, appetite change and fatigue.   HENT: Positive for drooling, mouth sores, sore throat and trouble swallowing.    Eyes: Negative for photophobia and visual disturbance.   Respiratory: Negative for cough and shortness of breath.    Cardiovascular: Negative for chest pain and palpitations.   Gastrointestinal: Positive for nausea. Negative for abdominal distention, abdominal pain, constipation, diarrhea and vomiting.   Genitourinary: Negative for difficulty urinating, dysuria and hematuria.   Musculoskeletal: Positive for arthralgias and gait problem. Negative for myalgias.   Skin: Positive for wound.   Neurological: Positive for speech difficulty. Negative for dizziness, weakness, light-headedness and headaches.   Psychiatric/Behavioral: Positive for agitation, behavioral problems, self-injury and suicidal ideas.     Objective:     Vital Signs (Most Recent):  Temp: 99.1 °F (37.3 °C) (11/27/18 1409)  Pulse: 79 (11/27/18 1500)  Resp: 13 (11/27/18 1500)  BP: 109/62 (11/27/18  1500)  SpO2: 95 % (11/27/18 1500) Vital Signs (24h Range):  Temp:  [97.6 °F (36.4 °C)-100.9 °F (38.3 °C)] 99.1 °F (37.3 °C)  Pulse:  [75-88] 79  Resp:  [13-18] 13  SpO2:  [95 %-100 %] 95 %  BP: (105-140)/(58-90) 109/62     Weight: 79.4 kg (175 lb)  Body mass index is 22.47 kg/m².    Physical Exam   Constitutional: He is oriented to person, place, and time. He appears well-developed and well-nourished. No distress.   HENT:   Head: Normocephalic and atraumatic.   Nose: Nose normal.   Mouth/Throat: No oropharyngeal exudate.   Eyes: Conjunctivae and EOM are normal. Pupils are equal, round, and reactive to light. No scleral icterus.   Neck: Normal range of motion. Neck supple.   Cardiovascular: Normal rate, regular rhythm, normal heart sounds and intact distal pulses.   Pulmonary/Chest: Effort normal and breath sounds normal.   Abdominal: Soft. Bowel sounds are normal.   Musculoskeletal: Normal range of motion. He exhibits no edema, tenderness or deformity.   Neurological: He is alert and oriented to person, place, and time.   Skin: Skin is warm and dry. He is not diaphoretic.   Psychiatric: He has a normal mood and affect. Thought content normal.   Judgment and Behavior Abnormal    Nursing note and vitals reviewed.        CRANIAL NERVES     CN III, IV, VI   Pupils are equal, round, and reactive to light.  Extraocular motions are normal.        Significant Labs:   Recent Lab Results       11/27/18  0816   11/27/18  0815   11/27/18  0755   11/27/18  0115        Benzodiazepines       Presumptive Positive     Methadone metabolites       Negative     Phencyclidine       Negative     Immature Granulocytes     0.4 0.1     Immature Grans (Abs)     0.06  Comment:  Mild elevation in immature granulocytes is non specific and   can be seen in a variety of conditions including stress response,   acute inflammation, trauma and pregnancy. Correlation with other   laboratory and clinical findings is essential.   0.01  Comment:  Mild  elevation in immature granulocytes is non specific and   can be seen in a variety of conditions including stress response,   acute inflammation, trauma and pregnancy. Correlation with other   laboratory and clinical findings is essential.       Acetaminophen (Tylenol), Serum       4.0  Comment:  Toxic Levels:  Adults (4 hr post-ingestion).........>150 ug/mL  Adults (12 hr post-ingestion)........>40 ug/mL  Peds (2 hr post-ingestion, liquid)...>225 ug/mL       Albumin     3.8 4.0     Alcohol, Medical, Serum       <10     Alkaline Phosphatase     70 87     ALT     14 15     Amphetamine Screen, Ur       Negative     Anion Gap     6 10     Appearance, UA       Hazy     AST     25 28     Bacteria, UA       Occasional     Barbiturate Screen, Ur       Negative     Baso #     0.04 0.06     Basophil%     0.2 0.8     Bilirubin (UA)       1+  Comment:  Positive urine bilirubin is not confirmed. Correlate with   serum bilirubin and clinical presentation.       Total Bilirubin     0.4  Comment:  For infants and newborns, interpretation of results should be based  on gestational age, weight and in agreement with clinical  observations.  Premature Infant recommended reference ranges:  Up to 24 hours.............<8.0 mg/dL  Up to 48 hours............<12.0 mg/dL  3-5 days..................<15.0 mg/dL  6-29 days.................<15.0 mg/dL   0.3  Comment:  For infants and newborns, interpretation of results should be based  on gestational age, weight and in agreement with clinical  observations.  Premature Infant recommended reference ranges:  Up to 24 hours.............<8.0 mg/dL  Up to 48 hours............<12.0 mg/dL  3-5 days..................<15.0 mg/dL  6-29 days.................<15.0 mg/dL       Blood Culture, Routine No Growth to date[P] No Growth to date[P]         BUN, Bld     23 26     Calcium     9.3 9.5     Chloride     108 106     CO2     28 27     Cocaine (Metab.)       Negative     Color, UA       Ginny     Creatinine      1.0 1.1     Creatinine, Random Ur       363.0  Comment:  The random urine reference ranges provided were established   for 24 hour urine collections.  No reference ranges exist for  random urine specimens.  Correlate clinically.       Differential Method     Automated Automated     eGFR if      >60.0 >60.0     eGFR if non      >60.0  Comment:  Calculation used to obtain the estimated glomerular filtration  rate (eGFR) is the CKD-EPI equation.    >60.0  Comment:  Calculation used to obtain the estimated glomerular filtration  rate (eGFR) is the CKD-EPI equation.        Eos #     0.2 0.2     Eosinophil%     1.0 2.8     Glucose     113 120     Glucose, UA       Negative     Gran # (ANC)     13.8 4.9     Gran%     83.5 62.2     Hematocrit     41.8 42.5     Hemoglobin     14.1 14.2     Hyaline Casts, UA       6     Ketones, UA       Negative     Lactate, Aydin   1.7  Comment:  Falsely low lactic acid results can be found in samples   containing >=13.0 mg/dL total bilirubin and/or >=3.5 mg/dL   direct bilirubin.           Leukocytes, UA       Trace     Lithium Lvl       <0.1     Lymph #     1.3 2.0     Lymph%     7.6 25.2     Magnesium     2.3       MCH     31.1 31.8     MCHC     33.7 33.4     MCV     92 95     Microscopic Comment       SEE COMMENT  Comment:  Other formed elements not mentioned in the report are not   present in the microscopic examination.        Mono #     1.2 0.7     Mono%     7.3 8.9     MPV     9.8 9.6     Nitrite, UA       Negative     nRBC     0 0     Occult Blood UA       Negative     Opiate Scrn, Ur       Presumptive Positive     pH, UA       5.0     Phosphorus     3.1       Platelets     282 293     Potassium     4.3 4.1     Total Protein     6.7 7.0     Protein, UA       Negative  Comment:  Recommend a 24 hour urine protein or a urine   protein/creatinine ratio if globulin induced proteinuria is  clinically suspected.       RBC     4.54 4.46     RBC, UA       2      RDW     12.8 12.8     Sodium     142 143     Specific Gravity, UA       >=1.030     Specimen UA       Urine, Clean Catch     Squam Epithel, UA       1     Marijuana (THC) Metabolite       Negative     Toxicology Information       SEE COMMENT  Comment:  This screen includes the following classes of drugs at the   listed cut-off:  Benzodiazepines                  200 ng/ml  Methadone                        300 ng/ml  Cocaine metabolite               300 ng/ml  Opiates                          300 ng/ml  Barbiturates                     200 ng/ml  Amphetamines                    1000 ng/ml  Marijuana metabs (THC)            50 ng/ml  Phencyclidine (PCP)               25 ng/ml  High concentrations of Diphenhydramine may cross-react with  Phencyclidine PCP screening immunoassay giving a false   positive result.  High concentrations of Methylenedioxymethamphetamine (MDMA aka  Ectasy) and other structurally similar compounds may cross-   react with the Amphetamine/Methamphetamine screening   immunoassay giving a false positive result.  A metabolite of the anti-HIV drug Sustiva () may cause  false positive results in the Marijuana metabolite (THC)   screening assay.  Note: This exception list includes only more common   interferants in toxicology screen testing.  Because of many   cross-reactantspositive results on toxicology drug screens   should be confirmed whenever results do not correlate with   clinical presentation.  This report is intended for use in clinical monitoring and  management of patients. It is not intended for use in   employment related drug testing.  Because of any cross-reactants, positive results on toxicology  drug screens should be confirmed whenever results do not  correlate with clinical presentation.  Presumptive positive results are unconfirmed and may be used   only for medical purposes.       TSH       0.953     WBC, UA       3     WBC     16.53 7.85           Significant Imaging: I  have reviewed all pertinent imaging results/findings within the past 24 hours.
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
## 2018-11-28 NOTE — HPI
42 y.o. M w/ Bipolar Disease presents to the ED following an apparent suicide attempt. Patient reports that following a fight with his wife he gestured as if he would swallow the contents from industrial strength detergent. The patient stated that he was not actually going to swallow the detergent, and inadvertently poured some on his lips and tongue. He then wanted flush his lips and tongue of the detergent; however he used hot water which activates the powder. Patient reports having blistering and bleeding per his mouth following this incident. Patient denies sore throat, vomiting, abd pain, SOB/POMPA, chest pain, or headaches. Patient reports that he non compliant with his medication for BPD, he reports last taking his latuda in august and his lithium a few weeks ago. He denies any substance use and reports that he tried a valium a few weeks ago but did not like how it made him feel. He also reports that he just started opiates for a knee injury sustained at work. However his wife reports that he uses benzos and opiates. Patient and wife deny alcohol or any other drug use
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
## 2018-11-28 NOTE — SUBJECTIVE & OBJECTIVE
"Interval History: NAEON. Clam and cooperative this AM. Discussed h/o bipolar disorder with wife present. Yesterday the patient denied having experienced all ssx of bipolar disorder. Patient states that he was "out of it" when I saw him yesterday. Today with wife present patient reports h/o manic episodes consisting of decreased need for sleep, increased energy, racing thoughts, and increased goal-directed behavior which can last up to 3 weeks. Wife confirms this.. He reports that he has previously been treated with Depakote, Lithium, and Risperdal. He was prescribed Latuda but unable to afford it. Patient has been off of medication for an extended period. Wife reports that he is chronically noncompliant with medication. Patient states that he would like to get back on medication at this time. Discussed risks/benefits of Abilify with patient to which he was agreeable. At this time, patient does not have any ssx of brenden and denies SI. However, wife is extremely uncomfortable with the patient being discharged as she feels that he is a significant threat to himself.     Family History     None        Tobacco Use    Smoking status: Current Every Day Smoker     Packs/day: 1.00     Types: Cigarettes   Substance and Sexual Activity    Alcohol use: Yes     Comment: social    Drug use: No    Sexual activity: Yes     Partners: Female     Psychotherapeutics (From admission, onward)    None           Review of Systems   Psychiatric/Behavioral: Negative for agitation, behavioral problems, confusion, decreased concentration, dysphoric mood, hallucinations, self-injury, sleep disturbance and suicidal ideas. The patient is not nervous/anxious and is not hyperactive.      Objective:     Vital Signs (Most Recent):  Temp: 98 °F (36.7 °C) (11/28/18 0347)  Pulse: 81 (11/28/18 0347)  Resp: 18 (11/28/18 0347)  BP: (!) 100/57 (11/28/18 0347)  SpO2: 98 % (11/28/18 0347) Vital Signs (24h Range):  Temp:  [97.8 °F (36.6 °C)-99.1 °F (37.3 " "°C)] 98 °F (36.7 °C)  Pulse:  [62-82] 81  Resp:  [13-18] 18  SpO2:  [95 %-100 %] 98 %  BP: (100-131)/(53-73) 100/57     Height: 6' 2" (188 cm)  Weight: 79.4 kg (175 lb)  Body mass index is 22.47 kg/m².      Intake/Output Summary (Last 24 hours) at 11/28/2018 1017  Last data filed at 11/27/2018 1401  Gross per 24 hour   Intake 500 ml   Output --   Net 500 ml       Physical Exam   Nursing note and vitals reviewed.    Mental Status Exam:  Appearance: age appropriate, lying in bed  Grooming: appropriate to situation  Arousal: alert, awake  Behavior/Cooperation: cooperative, eye contact normal  Speech: normal tone, normal rate, normal pitch, normal volume, spontaneous  Language: appropriate english vocabulary  Mood: "good"  Affect: full  Thought Process: logical  Thought Content: normal, no suicidality, no homicidality, delusions, or paranoia  Associations: no loose associations noted  Orientation: grossly intact  Memory: Grossly intact  Fund of Knowledge: appropriate for education level  Attention Span/Concentration: Grossly intact  Cognition: grossly intact  Insight: fair  Judgment: fair     Significant Labs:   Last 24 Hours:   Recent Lab Results       11/28/18  0651        Immature Granulocytes 0.4     Immature Grans (Abs) 0.08  Comment:  Mild elevation in immature granulocytes is non specific and   can be seen in a variety of conditions including stress response,   acute inflammation, trauma and pregnancy. Correlation with other   laboratory and clinical findings is essential.       Albumin 3.5     Alkaline Phosphatase 61     ALT 11     Anion Gap 7     AST 17     Baso # 0.03     Basophil% 0.2     Total Bilirubin 1.1  Comment:  For infants and newborns, interpretation of results should be based  on gestational age, weight and in agreement with clinical  observations.  Premature Infant recommended reference ranges:  Up to 24 hours.............<8.0 mg/dL  Up to 48 hours............<12.0 mg/dL  3-5 " days..................<15.0 mg/dL  6-29 days.................<15.0 mg/dL       BUN, Bld 17     Calcium 9.4     Chloride 106     CO2 26     Creatinine 0.9     Differential Method Automated     eGFR if African American >60.0     eGFR if non  >60.0  Comment:  Calculation used to obtain the estimated glomerular filtration  rate (eGFR) is the CKD-EPI equation.        Eos # 0.0     Eosinophil% 0.0     Glucose 127     Gran # (ANC) 15.8     Gran% 83.1     Hematocrit 41.7     Hemoglobin 14.0     Lymph # 1.6     Lymph% 8.6     Magnesium 2.0     MCH 31.5     MCHC 33.6     MCV 94     Mono # 1.5     Mono% 7.7     MPV 10.1     nRBC 0     Phosphorus 3.1     Platelets 268     Potassium 4.2     Total Protein 6.5     RBC 4.44     RDW 12.8     Sodium 139     WBC 19.02           Significant Imaging: None

## 2018-11-28 NOTE — PLAN OF CARE
Problem: SLP Goal  Goal: SLP Goal  Speech Language Pathology Goals  Goals expected to be met by 12/5  1. Pt will tolerate puree & thin liquids without s/s aspiration & adequate oral phase of swallow  2. Pt will tolerate trials of solids to determine when ready to advance diet  SLP Clinical Swallow Evaluation completed. See note for details.     Winnie Tariq MS, CCC-SLP  Speech Language Pathologist  Pager: [phone]  11/28/2018

## 2018-11-28 NOTE — NURSING
Pt last blood pressure 100/57. Pt requesting pain medication. Med team notified. Advised ok to give 1 mg of morphine at this time. Will continue to monitor.
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
## 2018-11-28 NOTE — HOSPITAL COURSE
11/27/2018 Patient evaluated by ENT, GI, and Psych   11/28/2018 Patient evaluated by SLP  11/29/2018 Patient is medically stable for discharge to psychiatric hospital. Patient can advance diet as tolerated.

## 2018-11-28 NOTE — PLAN OF CARE
Met w wife and pt  He doesn't have a Psychiatrist but is familiar with   [facility], [facility]    [facility] in [place], [place]  Address: [address] [place] [address]  Hours:   Open ? Closes 4:30PM  Phone: [phone]    Wife will make him an appt     11/28/18 1443   Right Care Assessment   Can the patient answer the patient profile reliably? Yes, cognitively intact   How often would a person be available to care for the patient? Often   Describe the patient's ability to walk at the present time. No restrictions   How does the patient rate their overall health at the present time? Fair   Number of comorbid conditions (as recorded on the chart) None   During the past month, has the patient often been bothered by feeling down, depressed or hopeless? No   During the past month, has the patient often been bothered by little interest or pleasure in doing things? No

## 2018-11-28 NOTE — PSYCH
Psych Nursing Safety Rounds done on pt today. No Safety issues at this time. Sitter at bedside. 1:1 Charting 15 min flowsheet. PEC/CEC in pt chart.

## 2018-11-29 ENCOUNTER — HOSPITAL ENCOUNTER (INPATIENT)
Facility: HOSPITAL | Age: 43
LOS: 6 days | Discharge: HOME | DRG: 885 | End: 2018-12-05
Attending: PSYCHIATRY & NEUROLOGY | Admitting: PSYCHIATRY & NEUROLOGY
Payer: MEDICAID

## 2018-11-29 VITALS
OXYGEN SATURATION: 98 % | DIASTOLIC BLOOD PRESSURE: 62 MMHG | RESPIRATION RATE: 18 BRPM | HEIGHT: 74 IN | TEMPERATURE: 98 F | SYSTOLIC BLOOD PRESSURE: 111 MMHG | HEART RATE: 62 BPM | WEIGHT: 175 LBS | BODY MASS INDEX: 22.46 KG/M2

## 2018-11-29 DIAGNOSIS — F31.75 BIPOLAR I DISORDER, MOST RECENT EPISODE DEPRESSED, IN PARTIAL REMISSION: ICD-10-CM

## 2018-11-29 DIAGNOSIS — F31.9 BIPOLAR 1 DISORDER: ICD-10-CM

## 2018-11-29 DIAGNOSIS — R45.851 SUICIDAL IDEATION: ICD-10-CM

## 2018-11-29 DIAGNOSIS — F31.4 BIPOLAR I DISORDER, MOST RECENT EPISODE DEPRESSED, SEVERE WITHOUT PSYCHOTIC FEATURES: Primary | ICD-10-CM

## 2018-11-29 DIAGNOSIS — T54.3X2A: ICD-10-CM

## 2018-11-29 LAB
ALBUMIN SERPL BCP-MCNC: 3.5 G/DL
ALP SERPL-CCNC: 66 U/L
ALT SERPL W/O P-5'-P-CCNC: 12 U/L
ANION GAP SERPL CALC-SCNC: 8 MMOL/L
AST SERPL-CCNC: 15 U/L
BASOPHILS # BLD AUTO: 0.05 K/UL
BASOPHILS NFR BLD: 0.4 %
BILIRUB SERPL-MCNC: 0.5 MG/DL
BUN SERPL-MCNC: 20 MG/DL
CALCIUM SERPL-MCNC: 9.4 MG/DL
CHLORIDE SERPL-SCNC: 104 MMOL/L
CO2 SERPL-SCNC: 24 MMOL/L
CREAT SERPL-MCNC: 0.9 MG/DL
DIFFERENTIAL METHOD: ABNORMAL
EOSINOPHIL # BLD AUTO: 0.2 K/UL
EOSINOPHIL NFR BLD: 1.8 %
ERYTHROCYTE [DISTWIDTH] IN BLOOD BY AUTOMATED COUNT: 13 %
EST. GFR  (AFRICAN AMERICAN): >60 ML/MIN/1.73 M^2
EST. GFR  (NON AFRICAN AMERICAN): >60 ML/MIN/1.73 M^2
GLUCOSE SERPL-MCNC: 112 MG/DL
HCT VFR BLD AUTO: 41.1 %
HGB BLD-MCNC: 14 G/DL
IMM GRANULOCYTES # BLD AUTO: 0.05 K/UL
IMM GRANULOCYTES NFR BLD AUTO: 0.4 %
LYMPHOCYTES # BLD AUTO: 2.4 K/UL
LYMPHOCYTES NFR BLD: 19.8 %
MAGNESIUM SERPL-MCNC: 2 MG/DL
MCH RBC QN AUTO: 31.4 PG
MCHC RBC AUTO-ENTMCNC: 34.1 G/DL
MCV RBC AUTO: 92 FL
MONOCYTES # BLD AUTO: 1.4 K/UL
MONOCYTES NFR BLD: 11.1 %
NEUTROPHILS # BLD AUTO: 8.2 K/UL
NEUTROPHILS NFR BLD: 66.5 %
NRBC BLD-RTO: 0 /100 WBC
PHOSPHATE SERPL-MCNC: 3.8 MG/DL
PLATELET # BLD AUTO: 273 K/UL
PMV BLD AUTO: 10.3 FL
POTASSIUM SERPL-SCNC: 4 MMOL/L
PROT SERPL-MCNC: 6.8 G/DL
RBC # BLD AUTO: 4.46 M/UL
SODIUM SERPL-SCNC: 136 MMOL/L
WBC # BLD AUTO: 12.3 K/UL

## 2018-11-29 PROCEDURE — 84100 ASSAY OF PHOSPHORUS: CPT

## 2018-11-29 PROCEDURE — 36415 COLL VENOUS BLD VENIPUNCTURE: CPT

## 2018-11-29 PROCEDURE — 99233 SBSQ HOSP IP/OBS HIGH 50: CPT | Mod: AF,HB,, | Performed by: PSYCHIATRY & NEUROLOGY

## 2018-11-29 PROCEDURE — G0378 HOSPITAL OBSERVATION PER HR: HCPCS

## 2018-11-29 PROCEDURE — 92526 ORAL FUNCTION THERAPY: CPT

## 2018-11-29 PROCEDURE — 25000003 PHARM REV CODE 250: Performed by: STUDENT IN AN ORGANIZED HEALTH CARE EDUCATION/TRAINING PROGRAM

## 2018-11-29 PROCEDURE — 80053 COMPREHEN METABOLIC PANEL: CPT

## 2018-11-29 PROCEDURE — 83735 ASSAY OF MAGNESIUM: CPT

## 2018-11-29 PROCEDURE — 25000003 PHARM REV CODE 250: Performed by: HOSPITALIST

## 2018-11-29 PROCEDURE — 25000003 PHARM REV CODE 250: Performed by: INTERNAL MEDICINE

## 2018-11-29 PROCEDURE — 63600175 PHARM REV CODE 636 W HCPCS: Performed by: STUDENT IN AN ORGANIZED HEALTH CARE EDUCATION/TRAINING PROGRAM

## 2018-11-29 PROCEDURE — 12400001 HC PSYCH SEMI-PRIVATE ROOM

## 2018-11-29 PROCEDURE — 25000003 PHARM REV CODE 250: Performed by: NURSE PRACTITIONER

## 2018-11-29 PROCEDURE — 99226 PR SUBSEQUENT OBSERVATION CARE,LEVEL III: CPT | Mod: ,,, | Performed by: HOSPITALIST

## 2018-11-29 PROCEDURE — 85025 COMPLETE CBC W/AUTO DIFF WBC: CPT

## 2018-11-29 RX ORDER — AMOXICILLIN 250 MG
2 CAPSULE ORAL 2 TIMES DAILY
Status: ON HOLD | COMMUNITY
Start: 2018-11-29 | End: 2018-12-05 | Stop reason: HOSPADM

## 2018-11-29 RX ORDER — ARIPIPRAZOLE 5 MG/1
10 TABLET ORAL DAILY
Status: DISCONTINUED | OUTPATIENT
Start: 2018-11-30 | End: 2018-11-29 | Stop reason: HOSPADM

## 2018-11-29 RX ORDER — CLINDAMYCIN HYDROCHLORIDE 150 MG/1
450 CAPSULE ORAL EVERY 6 HOURS
Status: DISCONTINUED | OUTPATIENT
Start: 2018-11-29 | End: 2018-12-05 | Stop reason: HOSPADM

## 2018-11-29 RX ORDER — OXYCODONE HYDROCHLORIDE 5 MG/1
5 TABLET ORAL ONCE
Status: COMPLETED | OUTPATIENT
Start: 2018-11-29 | End: 2018-11-29

## 2018-11-29 RX ORDER — CLINDAMYCIN HYDROCHLORIDE 150 MG/1
450 CAPSULE ORAL EVERY 6 HOURS
Qty: 7 CAPSULE | Refills: 0 | Status: ON HOLD | OUTPATIENT
Start: 2018-11-29 | End: 2018-12-05 | Stop reason: HOSPADM

## 2018-11-29 RX ORDER — CLINDAMYCIN HYDROCHLORIDE 150 MG/1
450 CAPSULE ORAL EVERY 6 HOURS
Status: DISCONTINUED | OUTPATIENT
Start: 2018-11-29 | End: 2018-11-29 | Stop reason: HOSPADM

## 2018-11-29 RX ORDER — IBUPROFEN 200 MG
1 TABLET ORAL DAILY PRN
Status: DISCONTINUED | OUTPATIENT
Start: 2018-11-29 | End: 2018-11-30

## 2018-11-29 RX ORDER — ARIPIPRAZOLE 5 MG/1
5 TABLET ORAL DAILY
Status: DISCONTINUED | OUTPATIENT
Start: 2018-11-30 | End: 2018-11-30

## 2018-11-29 RX ORDER — HYDROCODONE BITARTRATE AND ACETAMINOPHEN 7.5; 325 MG/1; MG/1
1 TABLET ORAL EVERY 6 HOURS PRN
Qty: 30 TABLET | Refills: 0 | Status: ON HOLD
Start: 2018-11-29 | End: 2018-12-05 | Stop reason: HOSPADM

## 2018-11-29 RX ORDER — POLYETHYLENE GLYCOL 3350 17 G/17G
17 POWDER, FOR SOLUTION ORAL DAILY PRN
Status: DISCONTINUED | OUTPATIENT
Start: 2018-11-29 | End: 2018-12-05 | Stop reason: HOSPADM

## 2018-11-29 RX ORDER — OXYCODONE HYDROCHLORIDE 10 MG/1
10 TABLET ORAL EVERY 4 HOURS PRN
Status: DISCONTINUED | OUTPATIENT
Start: 2018-11-29 | End: 2018-11-29 | Stop reason: HOSPADM

## 2018-11-29 RX ORDER — LOPERAMIDE HYDROCHLORIDE 2 MG/1
2 CAPSULE ORAL
Status: DISCONTINUED | OUTPATIENT
Start: 2018-11-29 | End: 2018-12-05 | Stop reason: HOSPADM

## 2018-11-29 RX ORDER — HYDROXYZINE HYDROCHLORIDE 50 MG/1
50 TABLET, FILM COATED ORAL NIGHTLY PRN
Status: DISCONTINUED | OUTPATIENT
Start: 2018-11-29 | End: 2018-12-05 | Stop reason: HOSPADM

## 2018-11-29 RX ORDER — PANTOPRAZOLE SODIUM 40 MG/1
40 TABLET, DELAYED RELEASE ORAL DAILY
Qty: 30 TABLET | Refills: 0 | Status: SHIPPED | OUTPATIENT
Start: 2018-11-30 | End: 2018-12-20

## 2018-11-29 RX ORDER — ACETAMINOPHEN 325 MG/1
650 TABLET ORAL EVERY 6 HOURS PRN
Status: DISCONTINUED | OUTPATIENT
Start: 2018-11-29 | End: 2018-12-05 | Stop reason: HOSPADM

## 2018-11-29 RX ORDER — CIPROFLOXACIN 500 MG/1
500 TABLET ORAL EVERY 12 HOURS
Status: DISCONTINUED | OUTPATIENT
Start: 2018-11-29 | End: 2018-11-29

## 2018-11-29 RX ORDER — LIDOCAINE HYDROCHLORIDE 20 MG/ML
2.5 SOLUTION OROPHARYNGEAL ONCE
Qty: 2.5 ML | Refills: 0 | Status: ON HOLD | OUTPATIENT
Start: 2018-11-29 | End: 2018-12-05 | Stop reason: HOSPADM

## 2018-11-29 RX ORDER — LIDOCAINE HYDROCHLORIDE 20 MG/ML
2.5 SOLUTION OROPHARYNGEAL ONCE
Status: DISCONTINUED | OUTPATIENT
Start: 2018-11-29 | End: 2018-11-29 | Stop reason: HOSPADM

## 2018-11-29 RX ORDER — ARIPIPRAZOLE 5 MG/1
5 TABLET ORAL ONCE
Status: CANCELLED | OUTPATIENT
Start: 2018-11-29

## 2018-11-29 RX ORDER — ARIPIPRAZOLE 5 MG/1
10 TABLET ORAL DAILY
Status: CANCELLED | OUTPATIENT
Start: 2018-11-30

## 2018-11-29 RX ORDER — AMOXICILLIN 250 MG
2 CAPSULE ORAL DAILY
Status: DISCONTINUED | OUTPATIENT
Start: 2018-11-29 | End: 2018-11-29 | Stop reason: HOSPADM

## 2018-11-29 RX ORDER — OXYCODONE HYDROCHLORIDE 5 MG/1
5 TABLET ORAL EVERY 4 HOURS PRN
Status: DISCONTINUED | OUTPATIENT
Start: 2018-11-29 | End: 2018-11-30

## 2018-11-29 RX ORDER — MAG HYDROX/ALUMINUM HYD/SIMETH 200-200-20
30 SUSPENSION, ORAL (FINAL DOSE FORM) ORAL EVERY 6 HOURS PRN
Status: DISCONTINUED | OUTPATIENT
Start: 2018-11-29 | End: 2018-12-05 | Stop reason: HOSPADM

## 2018-11-29 RX ORDER — ARIPIPRAZOLE 10 MG/1
10 TABLET ORAL DAILY
Qty: 30 TABLET | Refills: 1 | Status: ON HOLD | OUTPATIENT
Start: 2018-11-30 | End: 2018-12-05 | Stop reason: HOSPADM

## 2018-11-29 RX ADMIN — PANTOPRAZOLE SODIUM 40 MG: 40 TABLET, DELAYED RELEASE ORAL at 08:11

## 2018-11-29 RX ADMIN — OXYCODONE HYDROCHLORIDE 5 MG: 5 TABLET ORAL at 05:11

## 2018-11-29 RX ADMIN — ENOXAPARIN SODIUM 40 MG: 100 INJECTION SUBCUTANEOUS at 04:11

## 2018-11-29 RX ADMIN — OXYCODONE HYDROCHLORIDE 5 MG: 5 TABLET ORAL at 08:11

## 2018-11-29 RX ADMIN — CLINDAMYCIN HYDROCHLORIDE 450 MG: 150 CAPSULE ORAL at 05:11

## 2018-11-29 RX ADMIN — CIPROFLOXACIN 500 MG: 500 TABLET, FILM COATED ORAL at 08:11

## 2018-11-29 RX ADMIN — OXYCODONE HYDROCHLORIDE 10 MG: 10 TABLET ORAL at 11:11

## 2018-11-29 RX ADMIN — ARIPIPRAZOLE 5 MG: 5 TABLET ORAL at 08:11

## 2018-11-29 RX ADMIN — CLINDAMYCIN HYDROCHLORIDE 450 MG: 150 CAPSULE ORAL at 11:11

## 2018-11-29 RX ADMIN — STANDARDIZED SENNA CONCENTRATE AND DOCUSATE SODIUM 2 TABLET: 8.6; 5 TABLET, FILM COATED ORAL at 11:11

## 2018-11-29 RX ADMIN — CLINDAMYCIN HYDROCHLORIDE 450 MG: 150 CAPSULE ORAL at 12:11

## 2018-11-29 RX ADMIN — OXYCODONE HYDROCHLORIDE 10 MG: 10 TABLET ORAL at 04:11
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
## 2018-11-29 NOTE — PLAN OF CARE
11/29/18 1329   Final Note   Assessment Type Final Discharge Note   Anticipated Discharge Disposition (apu d/c today per md in huddle)

## 2018-11-29 NOTE — SUBJECTIVE & OBJECTIVE
Patient History           Medical as of 11/29/2018    Past Medical History: Patient provided no pertinent medical history.           Surgical as of 11/29/2018     Past Surgical History     Procedure Laterality Date Comments Source    ABDOMINAL SURGERY -- -- -- Provider    ESOPHAGOGASTRODUODENOSCOPY N/A 11/27/2018 Procedure: EGD (ESOPHAGOGASTRODUODENOSCOPY);  Surgeon: Garret Stacy MD;  Location: [address];  Service: Endoscopy;  Laterality: N/A; Provider                  Family as of 11/29/2018    None           Tobacco Use as of 11/29/2018     Smoking Status Smoking Start Date Smoking Quit Date Packs/Day Years Used    Current Every Day Smoker -- -- 1.00 --    Types Comments Smokeless Tobacco Status Smokeless Tobacco Quit Date Source     Cigarettes -- Unknown -- Provider            Alcohol Use as of 11/29/2018     Alcohol Use Drinks/Week Alcohol/Week Comments Source    Yes -- -- social Provider    Frequency Standard Drinks Binge Drinking Source      -- -- -- Provider             Drug Use as of 11/29/2018     Drug Use Types Frequency Comments Source    No -- -- -- Provider            Sexual Activity as of 11/29/2018     Sexually Active Birth Control Partners Comments Source    Yes -- Female -- Provider            Activities of Daily Living as of 11/29/2018    None           Social Documentation as of 11/29/2018    None           Occupational as of 11/29/2018    None           Socioeconomic as of 11/29/2018     Marital Status Spouse Name Number of Children Years Education Education Level Preferred Language Ethnicity Race Source    Single -- -- -- -- English /White White --    Financial Resource Strain Food Insecurity: Worry Food Insecurity: Inability Transportation Needs: Medical Transportation Needs: Non-medical       -- -- -- -- --             Pertinent History     Question Response Comments    Lives with -- --    Place in Birth Order -- --    Lives in -- --    Number of Siblings -- --    Raised  by -- --    Legal Involvement -- --    Childhood Trauma -- --    Criminal History of -- --    Financial Status -- --    Highest Level of Education -- --    Does patient have access to a firearm? -- --     Service -- --    Primary Leisure Activity -- --    Spirituality -- --        History reviewed. No pertinent past medical history.  Past Surgical History:   Procedure Laterality Date    ABDOMINAL SURGERY      EGD (ESOPHAGOGASTRODUODENOSCOPY) N/A 11/27/2018    Performed by Garret Stacy MD at [facility] ([address] [place] [address])    ESOPHAGOGASTRODUODENOSCOPY N/A 11/27/2018    Procedure: EGD (ESOPHAGOGASTRODUODENOSCOPY);  Surgeon: Garret Stacy MD;  Location: [facility] ([address] [place] [address]);  Service: Endoscopy;  Laterality: N/A;     Family History     None        Tobacco Use    Smoking status: Current Every Day Smoker     Packs/day: 1.00     Types: Cigarettes   Substance and Sexual Activity    Alcohol use: Yes     Comment: social    Drug use: No    Sexual activity: Yes     Partners: Female     Review of patient's allergies indicates:   Allergen Reactions    Pcn [penicillins] Hives    Propranolol        No current facility-administered medications on file prior to encounter.      Current Outpatient Medications on File Prior to Encounter   Medication Sig    ketorolac (TORADOL) 10 mg tablet Take 10 mg by mouth every 6 (six) hours.     Psychotherapeutics (From admission, onward)    Start     Stop Route Frequency Ordered    11/30/18 0900  ARIPiprazole tablet 10 mg      -- Oral Daily 11/29/18 1311        Review of Systems   Hematological: Does not bruise/bleed easily.     Strengths and Liabilities: {PSY STRENGTHS/LIABILITIES:48134}    Objective:     Vital Signs (Most Recent):  Temp: 97.4 °F (36.3 °C) (11/29/18 1156)  Pulse: (!) 57 (11/29/18 1156)  Resp: 17 (11/29/18 1156)  BP: 109/60 (11/29/18 1156)  SpO2: 97 % (11/29/18 1156) Vital Signs (24h Range):  Temp:  [97.4 °F (36.3 °C)-98.7 °F (37.1 °C)] 97.4 °F (36.3 °C)  Pulse:  [57-77]  "57  Resp:  [17-18] 17  SpO2:  [97 %-100 %] 97 %  BP: (103-125)/(57-78) 109/60     Height: 6' 2" (188 cm)  Weight: 79.4 kg (175 lb)  Body mass index is 22.47 kg/m².    No intake or output data in the 24 hours ending 11/29/18 1515    Physical Exam     Significant Labs: {Results:78320}    Significant Imaging: {Imaging Review:24974}  "
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
## 2018-11-29 NOTE — SUBJECTIVE & OBJECTIVE
"Interval History: NAEON. Calm and cooperative. Pt states that he is doing well this AM. Mood "okay." No ssx of brenden. No SE from Abiliy. Discussed plan to admit to inpatient psychiatric unit. Pt understands reason for admit. No SI.    Family History     None        Tobacco Use    Smoking status: Current Every Day Smoker     Packs/day: 1.00     Types: Cigarettes   Substance and Sexual Activity    Alcohol use: Yes     Comment: social    Drug use: No    Sexual activity: Yes     Partners: Female     Psychotherapeutics (From admission, onward)    Start     Stop Route Frequency Ordered    11/28/18 1530  ARIPiprazole tablet 5 mg      -- Oral Daily 11/28/18 1426           Review of Systems   Psychiatric/Behavioral: Negative for agitation, behavioral problems, confusion, decreased concentration, dysphoric mood, hallucinations, self-injury, sleep disturbance and suicidal ideas. The patient is not nervous/anxious and is not hyperactive.      Objective:     Vital Signs (Most Recent):  Temp: 98.3 °F (36.8 °C) (11/29/18 0805)  Pulse: 64 (11/29/18 0805)  Resp: 18 (11/29/18 0805)  BP: 118/74 (11/29/18 0805)  SpO2: 98 % (11/29/18 0805) Vital Signs (24h Range):  Temp:  [98.3 °F (36.8 °C)-98.8 °F (37.1 °C)] 98.3 °F (36.8 °C)  Pulse:  [60-77] 64  Resp:  [17-18] 18  SpO2:  [97 %-100 %] 98 %  BP: (103-125)/(57-78) 118/74     Height: 6' 2" (188 cm)  Weight: 79.4 kg (175 lb)  Body mass index is 22.47 kg/m².    No intake or output data in the 24 hours ending 11/29/18 0915    Physical Exam   Nursing note and vitals reviewed.    Mental Status Exam:  Appearance: age appropriate, lying in bed  Grooming: appropriate to situation  Arousal: alert, awake  Behavior/Cooperation: friendly and cooperative, eye contact normal  Speech: normal tone, normal rate, normal pitch, normal volume, spontaneous  Language: appropriate english vocabulary  Mood: "okay"  Affect: full  Thought Process: logical  Thought Content: normal, no suicidality, no " homicidality, delusions, or paranoia  Associations: no loose associations noted  Orientation: grossly intact  Memory: Grossly intact  Fund of Knowledge: appropriate for education level  Attention Span/Concentration: Grossly intact  Cognition: grossly intact  Insight: fair  Judgment: fair     Significant Labs:   Last 24 Hours:   Recent Lab Results       11/29/18  0639        Immature Granulocytes 0.4     Immature Grans (Abs) 0.05  Comment:  Mild elevation in immature granulocytes is non specific and   can be seen in a variety of conditions including stress response,   acute inflammation, trauma and pregnancy. Correlation with other   laboratory and clinical findings is essential.       Albumin 3.5     Alkaline Phosphatase 66     ALT 12     Anion Gap 8     AST 15     Baso # 0.05     Basophil% 0.4     Total Bilirubin 0.5  Comment:  For infants and newborns, interpretation of results should be based  on gestational age, weight and in agreement with clinical  observations.  Premature Infant recommended reference ranges:  Up to 24 hours.............<8.0 mg/dL  Up to 48 hours............<12.0 mg/dL  3-5 days..................<15.0 mg/dL  6-29 days.................<15.0 mg/dL       BUN, Bld 20     Calcium 9.4     Chloride 104     CO2 24     Creatinine 0.9     Differential Method Automated     eGFR if African American >60.0     eGFR if non  >60.0  Comment:  Calculation used to obtain the estimated glomerular filtration  rate (eGFR) is the CKD-EPI equation.        Eos # 0.2     Eosinophil% 1.8     Glucose 112     Gran # (ANC) 8.2     Gran% 66.5     Hematocrit 41.1     Hemoglobin 14.0     Lymph # 2.4     Lymph% 19.8     Magnesium 2.0     MCH 31.4     MCHC 34.1     MCV 92     Mono # 1.4     Mono% 11.1     MPV 10.3     nRBC 0     Phosphorus 3.8     Platelets 273     Potassium 4.0     Total Protein 6.8     RBC 4.46     RDW 13.0     Sodium 136     WBC 12.30           Significant Imaging: None
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
## 2018-11-29 NOTE — ED NOTES
Psych nursing safety rounds completed. No safety issues found. Sitter @ bedside, 1:1, Charting every 15 mins. Per flowsheet. PEC in the chart.
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
## 2018-11-29 NOTE — PSYCH
Psych Nursing Safety Rounds done on pt today. Notified RN of safety findings. Sitter at bedside. 1:1 Charting 15 min flowsheet. PEC/CEC in pt chart.
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
## 2018-11-29 NOTE — SUBJECTIVE & OBJECTIVE
Patient History           Medical as of 11/29/2018    Past Medical History: Patient provided no pertinent medical history.           Surgical as of 11/29/2018     Past Surgical History     Procedure Laterality Date Comments Source    ABDOMINAL SURGERY -- -- -- Provider    ESOPHAGOGASTRODUODENOSCOPY N/A 11/27/2018 Procedure: EGD (ESOPHAGOGASTRODUODENOSCOPY);  Surgeon: Garret Stacy MD;  Location: [address];  Service: Endoscopy;  Laterality: N/A; Provider                  Family as of 11/29/2018    None           Tobacco Use as of 11/29/2018     Smoking Status Smoking Start Date Smoking Quit Date Packs/Day Years Used    Current Every Day Smoker -- -- 1.00 --    Types Comments Smokeless Tobacco Status Smokeless Tobacco Quit Date Source     Cigarettes -- Unknown -- Provider            Alcohol Use as of 11/29/2018     Alcohol Use Drinks/Week Alcohol/Week Comments Source    Yes -- -- social Provider    Frequency Standard Drinks Binge Drinking Source      -- -- -- Provider             Drug Use as of 11/29/2018     Drug Use Types Frequency Comments Source    No -- -- -- Provider            Sexual Activity as of 11/29/2018     Sexually Active Birth Control Partners Comments Source    Yes -- Female -- Provider            Activities of Daily Living as of 11/29/2018    None           Social Documentation as of 11/29/2018    None           Occupational as of 11/29/2018    None           Socioeconomic as of 11/29/2018     Marital Status Spouse Name Number of Children Years Education Education Level Preferred Language Ethnicity Race Source    Single -- -- -- -- English /White White --    Financial Resource Strain Food Insecurity: Worry Food Insecurity: Inability Transportation Needs: Medical Transportation Needs: Non-medical       -- -- -- -- --             Pertinent History     Question Response Comments    Lives with -- --    Place in Birth Order -- --    Lives in -- --    Number of Siblings -- --    Raised  by -- --    Legal Involvement -- --    Childhood Trauma -- --    Criminal History of -- --    Financial Status -- --    Highest Level of Education -- --    Does patient have access to a firearm? -- --     Service -- --    Primary Leisure Activity -- --    Spirituality -- --        No past medical history on file.  Past Surgical History:   Procedure Laterality Date    ABDOMINAL SURGERY      EGD (ESOPHAGOGASTRODUODENOSCOPY) N/A 11/27/2018    Performed by Garret Stacy MD at [facility] ([address] [place] [address])    ESOPHAGOGASTRODUODENOSCOPY N/A 11/27/2018    Procedure: EGD (ESOPHAGOGASTRODUODENOSCOPY);  Surgeon: Garret Stacy MD;  Location: [facility] ([address] [place] [address]);  Service: Endoscopy;  Laterality: N/A;     Family History     None        Tobacco Use    Smoking status: Current Every Day Smoker     Packs/day: 1.00     Types: Cigarettes   Substance and Sexual Activity    Alcohol use: Yes     Comment: social    Drug use: No    Sexual activity: Yes     Partners: Female     Review of patient's allergies indicates:   Allergen Reactions    Pcn [penicillins] Hives    Propranolol        Current Facility-Administered Medications on File Prior to Encounter   Medication    [START ON 11/30/2018] ARIPiprazole tablet 10 mg    clindamycin capsule 450 mg    enoxaparin injection 40 mg    [COMPLETED] hydromorphone (PF) (DILAUDID) 2 mg/mL injection    lidocaine HCl 2% oral solution 2.5 mL    ondansetron injection 4 mg    [COMPLETED] oxyCODONE immediate release tablet 5 mg    oxyCODONE immediate release tablet Tab 10 mg    pantoprazole EC tablet 40 mg    senna-docusate 8.6-50 mg per tablet 2 tablet    [DISCONTINUED] ARIPiprazole tablet 5 mg    [DISCONTINUED] ciprofloxacin HCl tablet 500 mg    [DISCONTINUED] clindamycin capsule 450 mg    [DISCONTINUED] HYDROmorphone injection 0.5 mg    [DISCONTINUED] morphine injection 1 mg    [DISCONTINUED] oxyCODONE immediate release tablet 5 mg     Current Outpatient Medications on File Prior to  Encounter   Medication Sig    [START ON 11/30/2018] ARIPiprazole (ABILIFY) 10 MG Tab Take 1 tablet (10 mg total) by mouth once daily.    clindamycin (CLEOCIN) 150 MG capsule Take 3 capsules (450 mg total) by mouth every 6 (six) hours.    HYDROcodone-acetaminophen (NORCO) 7.5-325 mg per tablet Take 1 tablet by mouth every 6 (six) hours as needed for Pain.    ketorolac (TORADOL) 10 mg tablet Take 10 mg by mouth every 6 (six) hours.    lidocaine HCl 2% (XYLOCAINE) 2 % Soln 2.5 mLs by Mucous Membrane route once. for 1 dose    [START ON 11/30/2018] pantoprazole (PROTONIX) 40 MG tablet Take 1 tablet (40 mg total) by mouth once daily.    senna-docusate 8.6-50 mg (PERICOLACE) 8.6-50 mg per tablet Take 2 tablets by mouth 2 (two) times daily.     Psychotherapeutics (From admission, onward)    None        Review of Systems   Constitutional: Negative.    HENT:        Reports mouth, lip and tongue pain   Respiratory: Negative for chest tightness and shortness of breath.    Cardiovascular: Negative for chest pain.   Gastrointestinal: Negative for abdominal pain and diarrhea.   Endocrine: Negative.    Genitourinary: Negative for dysuria.   Musculoskeletal:        Left knee pain     Skin: Negative.    Allergic/Immunologic: Negative.    Neurological: Positive for speech difficulty and headaches.   Psychiatric/Behavioral: Negative.      Strengths and Liabilities: Strength: Patient accepts guidance/feedback, Strength: Patient has positive support network., Liability: Patient is impulsive., Liability: Patient has poor judgment, Liability: Patient lacks coping skills.    Objective:     Vital Signs (Most Recent):    Vital Signs (24h Range):  Temp:  [97.4 °F (36.3 °C)-98.5 °F (36.9 °C)] 98.1 °F (36.7 °C)  Pulse:  [57-70] 62  Resp:  [17-18] 18  SpO2:  [97 %-98 %] 98 %  BP: (109-125)/(60-78) 111/62           There is no height or weight on file to calculate BMI.    No intake or output data in the 24 hours ending 11/29/18  "1706    Physical Exam   Constitutional: He is oriented to person, place, and time. He appears well-developed.   HENT:   Head: Normocephalic.   Eyes: Pupils are equal, round, and reactive to light.   Neck: Normal range of motion.   Cardiovascular: Normal rate, regular rhythm and normal heart sounds.   Pulmonary/Chest: Breath sounds normal.   Abdominal: Soft. Bowel sounds are normal.   Neurological: He is oriented to person, place, and time.   Skin: Skin is warm and dry.     Mental Status Exam  Appearance: age appropriate, lying in bed   Behavior/Copperation: cooperative  Speech: normal tone, normal rate, normal pitch, normal volume  Mood:  "better"  Affect: Mood congruent  Thought Process: goal-directed, logical  Thought Content: normal, no suicidality, no homicidality, delusions, or paranoia  Orientation:  grossly intact  Memory: Grossly intact  Attention/Concentration:  Normal  Cognition: grossly intact  Insight: fair  Judgement: limited      NEUROLOGICAL EXAMINATION:     MENTAL STATUS   Oriented to person, place, and time.   Registration: recalls 3 of 3 objects. Recall at 5 minutes: recalls 3 of 3 objects.   Level of consciousness: alert  Knowledge: good.     CRANIAL NERVES     CN III, IV, VI   Pupils are equal, round, and reactive to light.    Significant Labs:   Last 24 Hours:   Recent Lab Results       11/29/18  0639        Immature Granulocytes 0.4     Immature Grans (Abs) 0.05  Comment:  Mild elevation in immature granulocytes is non specific and   can be seen in a variety of conditions including stress response,   acute inflammation, trauma and pregnancy. Correlation with other   laboratory and clinical findings is essential.       Albumin 3.5     Alkaline Phosphatase 66     ALT 12     Anion Gap 8     AST 15     Baso # 0.05     Basophil% 0.4     Total Bilirubin 0.5  Comment:  For infants and newborns, interpretation of results should be based  on gestational age, weight and in agreement with " clinical  observations.  Premature Infant recommended reference ranges:  Up to 24 hours.............<8.0 mg/dL  Up to 48 hours............<12.0 mg/dL  3-5 days..................<15.0 mg/dL  6-29 days.................<15.0 mg/dL       BUN, Bld 20     Calcium 9.4     Chloride 104     CO2 24     Creatinine 0.9     Differential Method Automated     eGFR if African American >60.0     eGFR if non  >60.0  Comment:  Calculation used to obtain the estimated glomerular filtration  rate (eGFR) is the CKD-EPI equation.        Eos # 0.2     Eosinophil% 1.8     Glucose 112     Gran # (ANC) 8.2     Gran% 66.5     Hematocrit 41.1     Hemoglobin 14.0     Lymph # 2.4     Lymph% 19.8     Magnesium 2.0     MCH 31.4     MCHC 34.1     MCV 92     Mono # 1.4     Mono% 11.1     MPV 10.3     nRBC 0     Phosphorus 3.8     Platelets 273     Potassium 4.0     Total Protein 6.8     RBC 4.46     RDW 13.0     Sodium 136     WBC 12.30           Significant Imaging: None

## 2018-11-29 NOTE — PLAN OF CARE
Problem: SLP Goal  Goal: SLP Goal  Speech Language Pathology Goals  Goals expected to be met by 12/5  1. Pt will tolerate puree & thin liquids without s/s aspiration & adequate oral phase of swallow  2. Pt will tolerate trials of solids to determine when ready to advance diet   Outcome: Ongoing (interventions implemented as appropriate)      Pt seen for ongoing swallow assessment. ST continue to recommend puree with thin liquids at this time with aspiration precautions in place. ST to continue to follow according to POC.     Alberta Jordan M.S., CCC-SLP  Speech Language Pathologist  [phone] - pager   11/29/2018
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
## 2018-11-29 NOTE — SUBJECTIVE & OBJECTIVE
Interval History: NAEO    Review of Systems   Constitutional: Negative for activity change, appetite change, fatigue and fever.   HENT: Positive for drooling, mouth sores, sore throat and trouble swallowing.    Eyes: Negative for photophobia and visual disturbance.   Respiratory: Negative for cough and shortness of breath.    Cardiovascular: Negative for chest pain and palpitations.   Gastrointestinal: Negative for abdominal distention, abdominal pain, constipation, diarrhea, nausea and vomiting.   Genitourinary: Negative for difficulty urinating, dysuria and hematuria.   Musculoskeletal: Positive for arthralgias and gait problem. Negative for myalgias.   Skin: Positive for wound.   Neurological: Positive for speech difficulty. Negative for dizziness, weakness, light-headedness and headaches.   Psychiatric/Behavioral: Positive for self-injury. Negative for agitation, behavioral problems and suicidal ideas.     Objective:     Vital Signs (Most Recent):  Temp: 98.7 °F (37.1 °C) (11/28/18 1601)  Pulse: 77 (11/28/18 1601)  Resp: 17 (11/28/18 1601)  BP: (!) 103/57 (11/28/18 1601)  SpO2: 100 % (11/28/18 1601) Vital Signs (24h Range):  Temp:  [98 °F (36.7 °C)-98.8 °F (37.1 °C)] 98.7 °F (37.1 °C)  Pulse:  [62-81] 77  Resp:  [17-18] 17  SpO2:  [97 %-100 %] 100 %  BP: (100-120)/(53-72) 103/57     Weight: 79.4 kg (175 lb)  Body mass index is 22.47 kg/m².  No intake or output data in the 24 hours ending 11/28/18 1819   Physical Exam   Constitutional: He is oriented to person, place, and time. He appears well-developed and well-nourished. No distress.   HENT:   Head: Normocephalic and atraumatic.   Nose: Nose normal.   Mouth/Throat: No oropharyngeal exudate.   Lye [place] to lips and anterior 2/3rds of lips    Eyes: Conjunctivae and EOM are normal. Pupils are equal, round, and reactive to light. No scleral icterus.   Neck: Normal range of motion. Neck supple.   Cardiovascular: Normal rate, regular rhythm, normal heart sounds and  intact distal pulses.   Pulmonary/Chest: Effort normal and breath sounds normal.   Abdominal: Soft. Bowel sounds are normal.   Musculoskeletal: Normal range of motion. He exhibits no edema, tenderness or deformity.   Neurological: He is alert and oriented to person, place, and time.   Skin: Skin is warm and dry. He is not diaphoretic.   Psychiatric: He has a normal mood and affect. Thought content normal.   Judgment and Behavior Abnormal    Nursing note and vitals reviewed.      Significant Labs:   CBC:   Recent Labs   Lab 11/27/18 0115 11/27/18 0755 11/28/18  0651   WBC 7.85 16.53* 19.02*   HGB 14.2 14.1 14.0   HCT 42.5 41.8 41.7    282 268     CMP:   Recent Labs   Lab 11/27/18 0115 11/27/18 0755 11/28/18  0651    142 139   K 4.1 4.3 4.2    108 106   CO2 27 28 26   * 113* 127*   BUN 26* 23* 17   CREATININE 1.1 1.0 0.9   CALCIUM 9.5 9.3 9.4   PROT 7.0 6.7 6.5   ALBUMIN 4.0 3.8 3.5   BILITOT 0.3 0.4 1.1*   ALKPHOS 87 70 61   AST 28 25 17   ALT 15 14 11   ANIONGAP 10 6* 7*   EGFRNONAA >60.0 >60.0 >60.0       Significant Imaging: I have reviewed all pertinent imaging results/findings within the past 24 hours.

## 2018-11-30 PROBLEM — F31.4 BIPOLAR I DISORDER, MOST RECENT EPISODE DEPRESSED, SEVERE WITHOUT PSYCHOTIC FEATURES: Status: ACTIVE | Noted: 2018-11-30

## 2018-11-30 PROBLEM — F13.90 BENZODIAZEPINE MISUSE: Status: ACTIVE | Noted: 2018-11-30

## 2018-11-30 PROBLEM — T54.3X2D: Status: ACTIVE | Noted: 2018-11-30

## 2018-11-30 LAB
25(OH)D3+25(OH)D2 SERPL-MCNC: 17 NG/ML
CHOLEST SERPL-MCNC: 147 MG/DL
CHOLEST/HDLC SERPL: 4.1 {RATIO}
ESTIMATED AVG GLUCOSE: 108 MG/DL
FOLATE SERPL-MCNC: 6.2 NG/ML
HAV IGM SERPL QL IA: NEGATIVE
HBA1C MFR BLD HPLC: 5.4 %
HBV CORE IGM SERPL QL IA: NEGATIVE
HBV SURFACE AG SERPL QL IA: NEGATIVE
HCV AB SERPL QL IA: NEGATIVE
HDLC SERPL-MCNC: 36 MG/DL
HDLC SERPL: 24.5 %
HIV 1+2 AB+HIV1 P24 AG SERPL QL IA: NEGATIVE
LDLC SERPL CALC-MCNC: 86 MG/DL
NONHDLC SERPL-MCNC: 111 MG/DL
T3FREE SERPL-MCNC: 2.9 PG/ML
T4 FREE SERPL-MCNC: 1.23 NG/DL
TRIGL SERPL-MCNC: 125 MG/DL
TSH SERPL DL<=0.005 MIU/L-ACNC: 0.38 UIU/ML
VIT B12 SERPL-MCNC: 759 PG/ML

## 2018-11-30 PROCEDURE — 99233 SBSQ HOSP IP/OBS HIGH 50: CPT | Mod: AF,HB,, | Performed by: PSYCHIATRY & NEUROLOGY

## 2018-11-30 PROCEDURE — 82306 VITAMIN D 25 HYDROXY: CPT

## 2018-11-30 PROCEDURE — 25000003 PHARM REV CODE 250: Performed by: PSYCHIATRY & NEUROLOGY

## 2018-11-30 PROCEDURE — 84481 FREE ASSAY (FT-3): CPT

## 2018-11-30 PROCEDURE — 36415 COLL VENOUS BLD VENIPUNCTURE: CPT

## 2018-11-30 PROCEDURE — 80061 LIPID PANEL: CPT

## 2018-11-30 PROCEDURE — S4991 NICOTINE PATCH NONLEGEND: HCPCS | Performed by: PSYCHIATRY & NEUROLOGY

## 2018-11-30 PROCEDURE — 86592 SYPHILIS TEST NON-TREP QUAL: CPT

## 2018-11-30 PROCEDURE — 86703 HIV-1/HIV-2 1 RESULT ANTBDY: CPT

## 2018-11-30 PROCEDURE — 80074 ACUTE HEPATITIS PANEL: CPT

## 2018-11-30 PROCEDURE — 84443 ASSAY THYROID STIM HORMONE: CPT

## 2018-11-30 PROCEDURE — 12400001 HC PSYCH SEMI-PRIVATE ROOM

## 2018-11-30 PROCEDURE — 84425 ASSAY OF VITAMIN B-1: CPT

## 2018-11-30 PROCEDURE — 25000003 PHARM REV CODE 250: Performed by: NURSE PRACTITIONER

## 2018-11-30 PROCEDURE — 82607 VITAMIN B-12: CPT

## 2018-11-30 PROCEDURE — 82746 ASSAY OF FOLIC ACID SERUM: CPT

## 2018-11-30 PROCEDURE — 84439 ASSAY OF FREE THYROXINE: CPT

## 2018-11-30 PROCEDURE — 83036 HEMOGLOBIN GLYCOSYLATED A1C: CPT

## 2018-11-30 RX ORDER — LITHIUM CARBONATE 300 MG/1
300 TABLET, FILM COATED, EXTENDED RELEASE ORAL EVERY 12 HOURS
Status: DISCONTINUED | OUTPATIENT
Start: 2018-11-30 | End: 2018-12-05 | Stop reason: HOSPADM

## 2018-11-30 RX ORDER — PANTOPRAZOLE SODIUM 40 MG/1
40 TABLET, DELAYED RELEASE ORAL DAILY
Status: DISCONTINUED | OUTPATIENT
Start: 2018-11-30 | End: 2018-12-05 | Stop reason: HOSPADM

## 2018-11-30 RX ORDER — ARIPIPRAZOLE 5 MG/1
5 TABLET ORAL NIGHTLY
Status: DISCONTINUED | OUTPATIENT
Start: 2018-11-30 | End: 2018-12-02

## 2018-11-30 RX ORDER — NICOTINE 7MG/24HR
1 PATCH, TRANSDERMAL 24 HOURS TRANSDERMAL DAILY
Status: DISCONTINUED | OUTPATIENT
Start: 2018-11-30 | End: 2018-12-05 | Stop reason: HOSPADM

## 2018-11-30 RX ORDER — LORAZEPAM 1 MG/1
2 TABLET ORAL EVERY 4 HOURS PRN
Status: ACTIVE | OUTPATIENT
Start: 2018-11-30 | End: 2018-12-03

## 2018-11-30 RX ADMIN — NICOTINE 1 PATCH: 7 PATCH, EXTENDED RELEASE TRANSDERMAL at 01:11

## 2018-11-30 RX ADMIN — CLINDAMYCIN HYDROCHLORIDE 450 MG: 150 CAPSULE ORAL at 06:11

## 2018-11-30 RX ADMIN — CLINDAMYCIN HYDROCHLORIDE 450 MG: 150 CAPSULE ORAL at 07:11

## 2018-11-30 RX ADMIN — OXYCODONE HYDROCHLORIDE 5 MG: 5 TABLET ORAL at 01:11

## 2018-11-30 RX ADMIN — ARIPIPRAZOLE 5 MG: 5 TABLET ORAL at 08:11

## 2018-11-30 RX ADMIN — PANTOPRAZOLE SODIUM 40 MG: 40 TABLET, DELAYED RELEASE ORAL at 01:11

## 2018-11-30 RX ADMIN — LITHIUM CARBONATE 300 MG: 300 TABLET, FILM COATED, EXTENDED RELEASE ORAL at 01:11

## 2018-11-30 RX ADMIN — CLINDAMYCIN HYDROCHLORIDE 450 MG: 150 CAPSULE ORAL at 12:11

## 2018-11-30 RX ADMIN — CLINDAMYCIN HYDROCHLORIDE 450 MG: 150 CAPSULE ORAL at 01:11

## 2018-11-30 RX ADMIN — LITHIUM CARBONATE 300 MG: 300 TABLET, FILM COATED, EXTENDED RELEASE ORAL at 08:11

## 2018-11-30 NOTE — HOSPITAL COURSE
"11/30/2018  Met with treatment team. Calm and cooperative. Patient reports he got in an argument with his wife over not taking his medications - lithium 300 mg PO BID and latuda. Says he could not afford the latuda and stopped the lithium because he needed to attend too many appointments to stay on treatment. Of his drinking  says he "just wasn't thinking" and is happy his children were sleeping.  Says that his current mood is "better."     Patient reports he was diagnosed with bipolar disorder at he age of 14 and reports he has been hospitalized 17 times. Endorses a hx of one previous suicide attempt by slitting his wrists. Has been on lithium therapy for the past 2 years and feels he does well just on lithium monotherapy. Denies side effects from Abilify he is currently taking.     Patient denies suicidal ideation at present. Agrees to inform staff of any emergence of thoughts of self-harm. He denies access to firearm at home.     Attending psychiatrist Dr. Hodge cared for patient on medical floor and discussed case with patient's wife. Per attestation 11/30, patient's wife reports he  has hx of ~18 previous psychiatric admissions nearly all for suicidal ideation as well as a history of several suicide attempts/gestures. Last summer he overdosed on seroquel and claimed it was accidental. Further, before this admission, wife says that patient did express SI prior to impulsively grabbing the bottle of lye and ingesting it. She notes he has a hx of minimizing his symptoms to providers to be discharged from the hospital and reports that he has expressed active suicidal ideation to her during this hospitalization. She also notes that pt has a hx of abusing opioids and sedatives.     12/1/2018   Patient calm and cooperative with interview; demonstrates constricted affect . Mood is "better" and attributes this to his wife visiting last night. Denies SI, HI, AVH. Patient has been medication compliant, but " "reports increased urination - encouraged to hydrate frequently. Received hydroxyzine prn insomnia last night. Reports sleeping well, appetite improved since "I can finally eat now". No somatic complaints, no other complaints during interview. Will f/u on EKG tomorrow AM.    12/2/2018   Patient calm and cooperative with interview. States he had improved sleep last night despite mouth pain. Mood is "better". States regrets of previous suicide attempt. Reports feeling more calm which he attributes to [place]. Patient is Denies SI, HI, AVH. Patient has been medication compliant, no medication side-effects reported. Received hydroxyzine 50 mg and ramelteon 8 mg in the past 24 hours. Reports sleeping and eating well.     12/3/18  Patient seen and interviewed with treatment team this morning.  Patient received Vistaril 50 mg PRN last night for sleep.  Discussed with patient that he was still currently on strict visual precautions. Patient denies that he is feeling suicidal at this time and refers to suicide attempt by ingestion of  as "call for help".   States that he had stopped taking his prescribed medications because he fell out of treatment and was using "street drugs".  He reports that he had tried going through [facility] or [facility] but states that he was told that he had to go through several appointments before he would be able to meet with a psychiatrist.  Reviewed patient's results from blood test this morning.  Lithium level currently at 0.4 mmol/L.  Describes mood as "alright".  Patient agreed to family meeting with wife tomorrow morning. Denies SI.      12/4/18  Patient seen in tx team along with his wife for family visit. Patient (and wife) calm and appropriate. Reports mood is "good", patient w/ no complaints or concerns today. Reviewed HPI. Wife expressed concern regarding possibility of patient having urge to self harm when she is not around (was about 10 feet away when patient ingested). Patient " cites poor medication adherence (Li and abilify) and illicit drug use as leading to ingestion. Patient denies current or historic side effects from Li and abilify. Wife reports patient has a long hx of poor medication adherence and frequently relapses into street drug use. Wife reports patient expresses SI on a weekly basis at home. Patient and wife report that he has his job waiting for him, and his boss is paying him for time missed during this hospital visit (boss very supportive). Reviewed side effects and possible dangers from poor medical adherence and poor clinic attendance (specifically with regards to importance of Li levels). Reviewed importance and need to work with outpatient providers. Reviewed tx team's concerns with illicit drug use, patient expressed desire to attend outpatient rehab/AA programs. Wife expressed concern over patient's commitment. Discussed outpatient follow-up after discharge ([facility] for possible substance tx), encouraged attendance. Patient and wife with no questions for tx team today.

## 2018-11-30 NOTE — ASSESSMENT & PLAN NOTE
- Patient presented after suicide gesture vs attempt by ingesting  after her had an argument with his wife. Endorses hx of bipolar disorder with 17 hospital admissions. Recently medication non-compliant 2/2 financial issues and time constraints but reports previously being well controlled on lithium   - Denies current suicidal ideation and reports he would inform staff should he experience thoughts of self harm  As documented in attestation by Dr. Hodge 11/30 pt with hx of minimizing sxs and has made active suicidal statements while admitted. Will make SVC out of caution and reduce observation statu once patient demonstrates ability to maintain safety on unit   - Continue Abilify 5 mg PO - will switch to nightly dosing as pt endorses drowsiness   - Start Lithium 300 mg PO BID   - Will draw lithium level, CMP CBC 12/3 am   - Other Labs: TSH, Free T4, Free T3, RPR, HIV, Hepatitis panel, B12, thiamine, Vitamin D, folate

## 2018-11-30 NOTE — SUBJECTIVE & OBJECTIVE
"Interval History: see hospital course     Family History     None        Tobacco Use    Smoking status: Current Every Day Smoker     Packs/day: 1.00     Types: Cigarettes   Substance and Sexual Activity    Alcohol use: Yes     Comment: social    Drug use: No    Sexual activity: Yes     Partners: Female     Psychotherapeutics (From admission, onward)    Start     Stop Route Frequency Ordered    11/30/18 0900  ARIPiprazole tablet 5 mg      -- Oral Daily 11/29/18 1843           Review of Systems   Constitutional: Negative for fever.   HENT: Positive for sore throat.         From ingestion of    Respiratory: Negative for shortness of breath.      Objective:     Vital Signs (Most Recent):  Temp: 98.1 °F (36.7 °C) (11/30/18 0828)  Pulse: (!) 112 (11/30/18 0828)  Resp: 18 (11/30/18 0828)  BP: 113/77 (11/30/18 0828) Vital Signs (24h Range):  Temp:  [97.4 °F (36.3 °C)-99 °F (37.2 °C)] 98.1 °F (36.7 °C)  Pulse:  [] 112  Resp:  [16-18] 18  SpO2:  [97 %-98 %] 98 %  BP: (109-127)/(60-77) 113/77     Height: 6' 2" (188 cm)  Weight: 64.6 kg (142 lb 6.7 oz)  Body mass index is 18.29 kg/m².    No intake or output data in the 24 hours ending 11/30/18 1004    Physical Exam      Mental Status Exam:  Appearance: unremarkable, age appropriate, thin, tattoed   Level of Consciousness: alert, awake  Behavior/Cooperation: normal, cooperative  Psychomotor: unremarkable   Speech: normal tone, normal rate, normal pitch, normal volume  Language: english, fluid  Orientation: grossly intact  Attention Span/Concentration: intact  Memory: Grossly intact  Mood: "better"  Affect: constricted  Thought Process: normal and logical  Associations: normal and logical  Thought Content: normal, no suicidality, no homicidality, delusions, or paranoia  Perceptual Disturbances: no auditory and/or visual hallucinations  Insight: limited  Judgment: fair    Significant Labs:   Last 24 Hours:   Recent Lab Results       11/30/18  0541        " Cholesterol 147  Comment:  The National Cholesterol Education Program (NCEP) has set the  following guidelines (reference ranges) for Cholesterol:  Optimal.....................<200 mg/dL  Borderline High.............200-239 mg/dL  High........................> or = 240 mg/dL       Estimated Avg Glucose 108     HDL 36  Comment:  The National Cholesterol Education Program (NCEP) has set the  following guidelines (reference values) for HDL Cholesterol:  Low...............<40 mg/dL  Optimal...........>60 mg/dL       HDL/Chol Ratio 24.5     Hemoglobin A1C 5.4  Comment:  ADA Screening Guidelines:  5.7-6.4%  Consistent with prediabetes  >or=6.5%  Consistent with diabetes  High levels of fetal hemoglobin interfere with the HbA1C  assay. Heterozygous hemoglobin variants (HbS, HgC, etc)do  not significantly interfere with this assay.   However, presence of multiple variants may affect accuracy.       LDL Cholesterol 86.0  Comment:  The National Cholesterol Education Program (NCEP) has set the  following guidelines (reference values) for LDL Cholesterol:  Optimal.......................<130 mg/dL  Borderline High...............130-159 mg/dL  High..........................160-189 mg/dL  Very High.....................>190 mg/dL       Non-HDL Cholesterol 111  Comment:  Risk category and Non-HDL cholesterol goals:  Coronary heart disease (CHD)or equivalent (10-year risk of CHD >20%):  Non-HDL cholesterol goal     <130 mg/dL  Two or more CHD risk factors and 10-year risk of CHD <= 20%:  Non-HDL cholesterol goal     <160 mg/dL  0 to 1 CHD risk factor:  Non-HDL cholesterol goal     <190 mg/dL       Total Cholesterol/HDL Ratio 4.1     Triglycerides 125  Comment:  The National Cholesterol Education Program (NCEP) has set the  following guidelines (reference values) for triglycerides:  Normal......................<150 mg/dL  Borderline High.............150-199 mg/dL  High........................200-499 mg/dL             Significant  Imaging:  CXR 11/27/18:   No significant abnormality identified.

## 2018-11-30 NOTE — PROGRESS NOTES
11/30/18 1300   [facility] Group Therapy   Group Name Therapeutic Recreation   Participation Level None   Participation Quality Withdrawn   Affect/Mood Display Blunted;Flat

## 2018-11-30 NOTE — ASSESSMENT & PLAN NOTE
- Patient's wife notes hx of misuse of sedatives and opioids   - Utox + for benzos and opioids on admission  - Will monitor vitals q4 hrs   - PRN ativan 2 mg for 2 or more of the following: SBP>160, DBP >100, HR>100, tremor, sweating.

## 2018-11-30 NOTE — PLAN OF CARE
Problem: Patient Care Overview (Adult)  Goal: Plan of Care Review  Outcome: Outcome(s) achieved Date Met: 11/30/18  Patient supine in bed. Patient alert and oriented. Patient denies any complaint of pain or discomfort at this time. Safety maintained. Will continue to monitor.

## 2018-11-30 NOTE — NURSING
Pt transferred to [facility]U with ERICA Mendoza, PCT, and security.  Pt belongings were taken home by wife.

## 2018-11-30 NOTE — PLAN OF CARE
"Problem: Patient Care Overview (Adult)  Goal: Plan of Care Review  Outcome: Ongoing (interventions implemented as appropriate)  Cooperative during the evening. Initially guarded on interaction but answers questions appropriately. Currently does not endorse feelings of depression or suicidal ideations. Attributes hospitalization and suicidal gestures due to a fight with wife. Verbalized to RN willing to take medications for Bipolar disorder. "I just want to get back on my medicine." Compliant with medications since admission. Continues with complaints of pain to mouth and tongue. Oxycodone administered as ordered. 6 hours of sleep observed. Suicide precautions and modified visual contact maintained per MD orders.       "

## 2018-11-30 NOTE — ASSESSMENT & PLAN NOTE
- Per IM discharge summary:  - Per SLP pt on pureed diet  may advance diet as tolerated  - Continue PO clindamycin q 6 hours to complete 10 day course   - Continue Protonix 40 mg PO qd    - Continue pain management with Oxycodone 5 mg q4 hrs PRN   - oral care with Vaseline to lip blisters
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
## 2018-12-01 LAB — RPR SER QL: NORMAL

## 2018-12-01 PROCEDURE — S4991 NICOTINE PATCH NONLEGEND: HCPCS | Performed by: PSYCHIATRY & NEUROLOGY

## 2018-12-01 PROCEDURE — 25000003 PHARM REV CODE 250: Performed by: PSYCHIATRY & NEUROLOGY

## 2018-12-01 PROCEDURE — 93010 ELECTROCARDIOGRAM REPORT: CPT | Mod: ,,, | Performed by: INTERNAL MEDICINE

## 2018-12-01 PROCEDURE — 93005 ELECTROCARDIOGRAM TRACING: CPT

## 2018-12-01 PROCEDURE — 12400001 HC PSYCH SEMI-PRIVATE ROOM

## 2018-12-01 PROCEDURE — 25000003 PHARM REV CODE 250: Performed by: NURSE PRACTITIONER

## 2018-12-01 PROCEDURE — 99232 SBSQ HOSP IP/OBS MODERATE 35: CPT | Mod: AF,HB,, | Performed by: PSYCHIATRY & NEUROLOGY

## 2018-12-01 RX ADMIN — CLINDAMYCIN HYDROCHLORIDE 450 MG: 150 CAPSULE ORAL at 11:12

## 2018-12-01 RX ADMIN — CLINDAMYCIN HYDROCHLORIDE 450 MG: 150 CAPSULE ORAL at 05:12

## 2018-12-01 RX ADMIN — LITHIUM CARBONATE 300 MG: 300 TABLET, FILM COATED, EXTENDED RELEASE ORAL at 09:12

## 2018-12-01 RX ADMIN — ACETAMINOPHEN 650 MG: 325 TABLET, FILM COATED ORAL at 09:12

## 2018-12-01 RX ADMIN — ARIPIPRAZOLE 5 MG: 5 TABLET ORAL at 08:12

## 2018-12-01 RX ADMIN — NICOTINE 1 PATCH: 7 PATCH, EXTENDED RELEASE TRANSDERMAL at 09:12

## 2018-12-01 RX ADMIN — HYDROXYZINE HYDROCHLORIDE 50 MG: 50 TABLET, FILM COATED ORAL at 08:12

## 2018-12-01 RX ADMIN — ACETAMINOPHEN 650 MG: 325 TABLET, FILM COATED ORAL at 05:12

## 2018-12-01 RX ADMIN — ACETAMINOPHEN 650 MG: 325 TABLET, FILM COATED ORAL at 01:12

## 2018-12-01 RX ADMIN — CLINDAMYCIN HYDROCHLORIDE 450 MG: 150 CAPSULE ORAL at 12:12

## 2018-12-01 RX ADMIN — PANTOPRAZOLE SODIUM 40 MG: 40 TABLET, DELAYED RELEASE ORAL at 09:12

## 2018-12-01 RX ADMIN — HYDROXYZINE HYDROCHLORIDE 50 MG: 50 TABLET, FILM COATED ORAL at 01:12

## 2018-12-01 RX ADMIN — ACETAMINOPHEN 650 MG: 325 TABLET, FILM COATED ORAL at 11:12

## 2018-12-01 NOTE — ASSESSMENT & PLAN NOTE
- Patient presented after suicide gesture vs attempt by ingesting  after her had an argument with his wife. Endorses hx of bipolar disorder with 17 hospital admissions. Recently medication non-compliant 2/2 financial issues and time constraints but reports previously being well controlled on lithium   - Denies current suicidal ideation and reports he would inform staff should he experience thoughts of self harm  As documented in attestation by Dr. Hodge 11/30 pt with hx of minimizing sxs and has made active suicidal statements while admitted. Will make SVC out of caution and reduce observation statu once patient demonstrates ability to maintain safety on unit   - Continue Abilify 5 mg PO - will switch to nightly dosing as pt endorses drowsiness   - Start Lithium 300 mg PO BID   - Will draw lithium level, CMP CBC 12/3 am   - Other Labs: TSH, Free T4, Free T3, RPR, HIV, Hepatitis panel, B12, thiamine, Vitamin D, folate   - F/u EKG on Sunday 12/2 - can increase Abilify if unremarkable
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
## 2018-12-01 NOTE — PLAN OF CARE
"Problem: Patient Care Overview  Goal: Plan of Care Review  Outcome: Ongoing (interventions implemented as appropriate)  Observed awake and alert lying in bed. Affect flat, mood depressed. Denied SI/HI. AV halllucinations. No agitation or hostile behavior noted pt. 1:1 monitoring for pt safety. Pt. awake after 0100 stating he cannot sleep and complained of  "tongue pain" Gave Tylenol 650 mg p.o. for complaint of pain rated 10/10, and Atarax 50 mg p.o.for insomnia. Presently resting quietly in his room. Free from falls/injury. Safety maintained. Continue 1:1 monitoring for pt safety.      "

## 2018-12-01 NOTE — SUBJECTIVE & OBJECTIVE
"Interval History: see hospital course     Family History     None        Tobacco Use    Smoking status: Current Every Day Smoker     Packs/day: 1.00     Types: Cigarettes   Substance and Sexual Activity    Alcohol use: Yes     Comment: social    Drug use: No    Sexual activity: Yes     Partners: Female     Psychotherapeutics (From admission, onward)    Start     Stop Route Frequency Ordered    11/30/18 2100  ARIPiprazole tablet 5 mg      -- Oral Nightly 11/30/18 1020    11/30/18 1440  LORazepam tablet 2 mg      12/03 1439 Oral Every 4 hours PRN 11/30/18 1340    11/30/18 1115  lithium CR tablet 300 mg      -- Oral Every 12 hours 11/30/18 1014           Review of Systems   Constitutional: Negative for fever.   HENT: Positive for sore throat.         From ingestion of    Respiratory: Negative for shortness of breath.      Objective:     Vital Signs (Most Recent):  Temp: 98.7 °F (37.1 °C) (12/01/18 0801)  Pulse: 75 (12/01/18 0801)  Resp: 16 (12/01/18 0801)  BP: 116/65 (12/01/18 0801) Vital Signs (24h Range):  Temp:  [97.7 °F (36.5 °C)-98.7 °F (37.1 °C)] 98.7 °F (37.1 °C)  Pulse:  [75-82] 75  Resp:  [16-18] 16  BP: (113-121)/(59-76) 116/65     Height: 6' 2" (188 cm)  Weight: 64.6 kg (142 lb 6.7 oz)  Body mass index is 18.29 kg/m².    No intake or output data in the 24 hours ending 12/01/18 0946    Physical Exam          Mental Status Exam:  Appearance: unremarkable, age appropriate, thin, tattoed   Level of Consciousness: alert, awake  Behavior/Cooperation: normal, cooperative  Psychomotor: unremarkable   Speech: normal tone, normal rate, normal pitch, normal volume  Language: english, fluid  Orientation: grossly intact  Attention Span/Concentration: intact  Memory: grossly intact  Mood: "better"  Affect: constricted  Thought Process: normal and logical  Associations: normal and logical  Thought Content: normal, no suicidality, no homicidality, delusions, or paranoia  Perceptual Disturbances: no auditory " and/or visual hallucinations  Insight: limited  Judgment: fair    Significant Labs:   Last 24 Hours:   Recent Lab Results       11/30/18  1251   11/30/18  1250        Folate   6.2     Free T4 1.23       Hep A IgM Negative       Hep B C IgM Negative       Hepatitis B Surface Ag Negative       Hepatitis C Ab Negative       HIV 1/2 Ag/Ab Negative       T3, Free 2.9       TSH 0.382       Vit D, 25-Hydroxy 17  Comment:  Vitamin D deficiency.........<10 ng/mL                              Vitamin D insufficiency......10-29 ng/mL       Vitamin D sufficiency........> or equal to 30 ng/mL  Vitamin D toxicity............>100 ng/mL         Vitamin B-12 579             Significant Imaging:  CXR 11/27/18:   No significant abnormality identified.
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
## 2018-12-01 NOTE — PROGRESS NOTES
12/01/18 0930 12/01/18 1000 12/01/18 1100   [facility] Group Therapy   Group Name Community Reintegration Mental Awareness Stress Management   Specific Interventions Current Events Cognitive Stimulation Training Guided Imagery/Relaxation   Participation Level None None Active;Supportive;Appropriate   Participation Quality Withdrawn;Sleeping Withdrawn;Lack of Interest Cooperative   Insight/Motivation --  --  Improved   Affect/Mood Display --  --  Appropriate   Cognition --  --  Alert;Oriented       12/01/18 1400   [facility] Group Therapy   Group Name Therapeutic Recreation   Specific Interventions Skilled Activity Crafts   Participation Level None   Participation Quality --    Insight/Motivation --    Affect/Mood Display --    Cognition --

## 2018-12-01 NOTE — PLAN OF CARE
12/01/18 1300   [facility] Group Therapy   Group Name Community Reintegration   Specific Interventions Cognitive Stimulation Training   Participation Level None   Participation Quality Refused

## 2018-12-01 NOTE — PLAN OF CARE
Problem: Patient Care Overview (Adult)  Goal: Plan of Care Review  Outcome: Ongoing (interventions implemented as appropriate)  Patient calm and cooperative. Denies suicidal and made contract for safety. SVC observed with 1:1 sitter. Med compliant. Isolative and withdrawn. Tylenol given for mouth discomfort. Will continue to encourage to verbalize feelings.
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
## 2018-12-02 LAB
BACTERIA BLD CULT: NORMAL
BACTERIA BLD CULT: NORMAL

## 2018-12-02 PROCEDURE — 25000003 PHARM REV CODE 250: Performed by: NURSE PRACTITIONER

## 2018-12-02 PROCEDURE — 25000003 PHARM REV CODE 250: Performed by: PSYCHIATRY & NEUROLOGY

## 2018-12-02 PROCEDURE — S4991 NICOTINE PATCH NONLEGEND: HCPCS | Performed by: PSYCHIATRY & NEUROLOGY

## 2018-12-02 PROCEDURE — 12400001 HC PSYCH SEMI-PRIVATE ROOM

## 2018-12-02 PROCEDURE — 99232 SBSQ HOSP IP/OBS MODERATE 35: CPT | Mod: AF,HB,, | Performed by: PSYCHIATRY & NEUROLOGY

## 2018-12-02 RX ORDER — RAMELTEON 8 MG/1
8 TABLET ORAL NIGHTLY PRN
Status: DISCONTINUED | OUTPATIENT
Start: 2018-12-02 | End: 2018-12-05 | Stop reason: HOSPADM

## 2018-12-02 RX ORDER — ARIPIPRAZOLE 10 MG/1
10 TABLET ORAL DAILY
Status: DISCONTINUED | OUTPATIENT
Start: 2018-12-02 | End: 2018-12-05 | Stop reason: HOSPADM

## 2018-12-02 RX ORDER — ARIPIPRAZOLE 10 MG/1
10 TABLET ORAL NIGHTLY
Status: DISCONTINUED | OUTPATIENT
Start: 2018-12-02 | End: 2018-12-02

## 2018-12-02 RX ADMIN — RAMELTEON 8 MG: 8 TABLET, FILM COATED ORAL at 01:12

## 2018-12-02 RX ADMIN — PANTOPRAZOLE SODIUM 40 MG: 40 TABLET, DELAYED RELEASE ORAL at 08:12

## 2018-12-02 RX ADMIN — CLINDAMYCIN HYDROCHLORIDE 450 MG: 150 CAPSULE ORAL at 06:12

## 2018-12-02 RX ADMIN — LITHIUM CARBONATE 300 MG: 300 TABLET, FILM COATED, EXTENDED RELEASE ORAL at 08:12

## 2018-12-02 RX ADMIN — RAMELTEON 8 MG: 8 TABLET, FILM COATED ORAL at 08:12

## 2018-12-02 RX ADMIN — CLINDAMYCIN HYDROCHLORIDE 450 MG: 150 CAPSULE ORAL at 12:12

## 2018-12-02 RX ADMIN — ACETAMINOPHEN 650 MG: 325 TABLET, FILM COATED ORAL at 08:12

## 2018-12-02 RX ADMIN — ARIPIPRAZOLE 10 MG: 10 TABLET ORAL at 12:12

## 2018-12-02 RX ADMIN — NICOTINE 1 PATCH: 7 PATCH, EXTENDED RELEASE TRANSDERMAL at 08:12

## 2018-12-02 RX ADMIN — CLINDAMYCIN HYDROCHLORIDE 450 MG: 150 CAPSULE ORAL at 11:12

## 2018-12-02 NOTE — ASSESSMENT & PLAN NOTE
- Patient presented after suicide gesture vs attempt by ingesting  after her had an argument with his wife. Endorses hx of bipolar disorder with 17 hospital admissions. Recently medication non-compliant 2/2 financial issues and time constraints but reports previously being well controlled on lithium   - Denies current suicidal ideation and reports he would inform staff should he experience thoughts of self harm  As documented in attestation by Dr. Hodge 11/30 pt with hx of minimizing sxs and has made active suicidal statements while admitted. Continue  SVC out of caution and reduce observation statu once patient demonstrates ability to maintain safety on unit   - Increase Abilify 5 mg po qhs to 10 mg po q am. Patient no longer complaining of drowsiness.  - Continue Lithium 300 mg PO BID   - Will draw lithium level, CMP CBC 12/3 am   - Other Labs: TSH 0.382, Free T4 1.23, Free T3 2.9, RPR non-reactive, HIV (-), Hepatitis panel wnl, B12 759, thiamine, Vitamin D 17, folate 6.2  -  EKG QTc 405, NSR

## 2018-12-02 NOTE — PLAN OF CARE
12/02/18 1200   [facility] Group Therapy   Group Name Community Reintegration   Specific Interventions Social Skills Training   Participation Level None   Participation Quality Refused

## 2018-12-02 NOTE — PLAN OF CARE
Problem: Patient Care Overview (Adult)  Goal: Plan of Care Review  Outcome: Ongoing (interventions implemented as appropriate)  Patient calm and cooperative. Endorses improved mood. Denies any problem swallowing. Appetite good. Med compliant. Denies SI/HI/AVH. More interactive and isolative. SVC observed with 1:1 sitter. Made contract for safety. Will continue to monitor patient.

## 2018-12-02 NOTE — NURSING
Medication effective. Pt. resting quietly in bed. Respirations even and unlabored. Continue 1:1 monitoring.

## 2018-12-02 NOTE — SUBJECTIVE & OBJECTIVE
"Interval History: see hospital course     Family History     None        Tobacco Use    Smoking status: Current Every Day Smoker     Packs/day: 1.00     Types: Cigarettes   Substance and Sexual Activity    Alcohol use: Yes     Comment: social    Drug use: No    Sexual activity: Yes     Partners: Female     Psychotherapeutics (From admission, onward)    Start     Stop Route Frequency Ordered    12/02/18 0217  ramelteon tablet 8 mg      -- Oral Nightly PRN 12/02/18 0117    11/30/18 2100  ARIPiprazole tablet 5 mg      -- Oral Nightly 11/30/18 1020    11/30/18 1440  LORazepam tablet 2 mg      12/03 1439 Oral Every 4 hours PRN 11/30/18 1340    11/30/18 1115  lithium CR tablet 300 mg      -- Oral Every 12 hours 11/30/18 1014           Review of Systems   Constitutional: Negative for fever.   HENT: Positive for sore throat.         From ingestion of    Gastrointestinal: Negative for abdominal pain, blood in stool and constipation.     Objective:     Vital Signs (Most Recent):  Temp: 97.8 °F (36.6 °C) (12/02/18 0752)  Pulse: 76 (12/02/18 0752)  Resp: 16 (12/02/18 0752)  BP: 116/67 (12/02/18 0752) Vital Signs (24h Range):  Temp:  [97.8 °F (36.6 °C)-98.1 °F (36.7 °C)] 97.8 °F (36.6 °C)  Pulse:  [76-93] 76  Resp:  [16] 16  BP: (116-130)/(67-70) 116/67     Height: 6' 2" (188 cm)  Weight: 64.6 kg (142 lb 6.7 oz)  Body mass index is 18.29 kg/m².    No intake or output data in the 24 hours ending 12/02/18 0813    Physical Exam          Mental Status Exam:  Appearance: unremarkable, age appropriate, thin, tattoed   Level of Consciousness: alert, awake  Behavior/Cooperation: normal, cooperative  Psychomotor: unremarkable   Speech: normal tone, normal rate, normal pitch, normal volume  Language: english, fluid  Orientation: grossly intact  Attention Span/Concentration: intact  Memory: grossly intact  Mood: "better"  Affect: constricted  Thought Process: normal and logical  Associations: normal and logical  Thought " Content: normal, no suicidality, no homicidality, delusions, or paranoia  Perceptual Disturbances: no auditory and/or visual hallucinations  Insight: limited  Judgment: fair    Significant Labs:   Last 24 Hours:   Recent Lab Results     None          Significant Imaging:  CXR 11/27/18:   No significant abnormality identified.

## 2018-12-02 NOTE — PLAN OF CARE
Problem: Patient Care Overview  Goal: Plan of Care Review  Outcome: Ongoing (interventions implemented as appropriate)  Observed awake and alert in his room with 1:1 sitter at his side. Denied wanting to harm himself at this time and has contracted for safety. Pt. much brighter than yesterday and easily engages. Took scheduled medications without any problems, requesting Hydroxyzine for sleep. ( see MAR). Appeared asleep throughout the night as noted during frequent rounds. Free from falls/injury. Safety maintained. Continue to monitor.

## 2018-12-02 NOTE — NURSING
"Pt's up and out of bed stating, " I cannot sleep, I've just been tossing and turning! That  Vistaril you gave me the other night worked for me, but the one I took last night after 8 o'clock didn't touch me" Call placed to Dr. Shipman. Order placed for Ramelteon 8 mg  p.o. X 1. Medication given per MD's orders. Presently lying awake in bed with 1:1 monitoring at his side for pt safety. Continue to monitor.  "

## 2018-12-03 PROBLEM — F31.75 BIPOLAR I DISORDER, MOST RECENT EPISODE DEPRESSED, IN PARTIAL REMISSION: Status: ACTIVE | Noted: 2018-12-03

## 2018-12-03 LAB
ALBUMIN SERPL BCP-MCNC: 3.7 G/DL
ALP SERPL-CCNC: 62 U/L
ALT SERPL W/O P-5'-P-CCNC: 14 U/L
ANION GAP SERPL CALC-SCNC: 8 MMOL/L
AST SERPL-CCNC: 14 U/L
BASOPHILS # BLD AUTO: 0.06 K/UL
BASOPHILS NFR BLD: 0.4 %
BILIRUB SERPL-MCNC: 1 MG/DL
BUN SERPL-MCNC: 15 MG/DL
CALCIUM SERPL-MCNC: 9.7 MG/DL
CHLORIDE SERPL-SCNC: 101 MMOL/L
CO2 SERPL-SCNC: 28 MMOL/L
CREAT SERPL-MCNC: 1 MG/DL
DIFFERENTIAL METHOD: ABNORMAL
EOSINOPHIL # BLD AUTO: 0.3 K/UL
EOSINOPHIL NFR BLD: 2.5 %
ERYTHROCYTE [DISTWIDTH] IN BLOOD BY AUTOMATED COUNT: 12.4 %
EST. GFR  (AFRICAN AMERICAN): >60 ML/MIN/1.73 M^2
EST. GFR  (NON AFRICAN AMERICAN): >60 ML/MIN/1.73 M^2
GLUCOSE SERPL-MCNC: 95 MG/DL
HCT VFR BLD AUTO: 43.2 %
HGB BLD-MCNC: 14.9 G/DL
IMM GRANULOCYTES # BLD AUTO: 0.09 K/UL
IMM GRANULOCYTES NFR BLD AUTO: 0.7 %
LITHIUM SERPL-SCNC: 0.4 MMOL/L
LYMPHOCYTES # BLD AUTO: 2.5 K/UL
LYMPHOCYTES NFR BLD: 18.6 %
MCH RBC QN AUTO: 31.2 PG
MCHC RBC AUTO-ENTMCNC: 34.5 G/DL
MCV RBC AUTO: 90 FL
MONOCYTES # BLD AUTO: 1.3 K/UL
MONOCYTES NFR BLD: 9.4 %
NEUTROPHILS # BLD AUTO: 9.2 K/UL
NEUTROPHILS NFR BLD: 68.4 %
NRBC BLD-RTO: 0 /100 WBC
PLATELET # BLD AUTO: 339 K/UL
PMV BLD AUTO: 9.8 FL
POTASSIUM SERPL-SCNC: 4 MMOL/L
PROT SERPL-MCNC: 7.1 G/DL
RBC # BLD AUTO: 4.78 M/UL
SODIUM SERPL-SCNC: 137 MMOL/L
WBC # BLD AUTO: 13.46 K/UL

## 2018-12-03 PROCEDURE — 80178 ASSAY OF LITHIUM: CPT

## 2018-12-03 PROCEDURE — 80053 COMPREHEN METABOLIC PANEL: CPT

## 2018-12-03 PROCEDURE — 90853 GROUP PSYCHOTHERAPY: CPT | Mod: HP,HB,, | Performed by: PSYCHOLOGIST

## 2018-12-03 PROCEDURE — 25000003 PHARM REV CODE 250: Performed by: PSYCHIATRY & NEUROLOGY

## 2018-12-03 PROCEDURE — 99232 SBSQ HOSP IP/OBS MODERATE 35: CPT | Mod: AF,HB,, | Performed by: PSYCHIATRY & NEUROLOGY

## 2018-12-03 PROCEDURE — S4991 NICOTINE PATCH NONLEGEND: HCPCS | Performed by: PSYCHIATRY & NEUROLOGY

## 2018-12-03 PROCEDURE — 12400001 HC PSYCH SEMI-PRIVATE ROOM

## 2018-12-03 PROCEDURE — 85025 COMPLETE CBC W/AUTO DIFF WBC: CPT

## 2018-12-03 PROCEDURE — 36415 COLL VENOUS BLD VENIPUNCTURE: CPT

## 2018-12-03 PROCEDURE — 25000003 PHARM REV CODE 250: Performed by: NURSE PRACTITIONER

## 2018-12-03 RX ADMIN — HYDROXYZINE HYDROCHLORIDE 50 MG: 50 TABLET, FILM COATED ORAL at 08:12

## 2018-12-03 RX ADMIN — LITHIUM CARBONATE 300 MG: 300 TABLET, FILM COATED, EXTENDED RELEASE ORAL at 08:12

## 2018-12-03 RX ADMIN — PANTOPRAZOLE SODIUM 40 MG: 40 TABLET, DELAYED RELEASE ORAL at 08:12

## 2018-12-03 RX ADMIN — CLINDAMYCIN HYDROCHLORIDE 450 MG: 150 CAPSULE ORAL at 06:12

## 2018-12-03 RX ADMIN — NICOTINE 1 PATCH: 7 PATCH, EXTENDED RELEASE TRANSDERMAL at 08:12

## 2018-12-03 RX ADMIN — CLINDAMYCIN HYDROCHLORIDE 450 MG: 150 CAPSULE ORAL at 11:12

## 2018-12-03 RX ADMIN — CLINDAMYCIN HYDROCHLORIDE 450 MG: 150 CAPSULE ORAL at 03:12

## 2018-12-03 RX ADMIN — CLINDAMYCIN HYDROCHLORIDE 450 MG: 150 CAPSULE ORAL at 05:12

## 2018-12-03 RX ADMIN — Medication 5 ML: at 03:12

## 2018-12-03 RX ADMIN — ARIPIPRAZOLE 10 MG: 10 TABLET ORAL at 08:12

## 2018-12-03 RX ADMIN — Medication 5 ML: at 11:12

## 2018-12-03 RX ADMIN — HYDROXYZINE HYDROCHLORIDE 50 MG: 50 TABLET, FILM COATED ORAL at 12:12

## 2018-12-03 NOTE — PLAN OF CARE
Problem: Patient Care Overview (Adult)  Goal: Plan of Care Review  Outcome: Ongoing (interventions implemented as appropriate)  Patient sitting up in bed. Patient alert and oriented. Patient denies any complaints of pain or discomfort. Safety maintained. Will continue to monitor.

## 2018-12-03 NOTE — SUBJECTIVE & OBJECTIVE
"Interval History: See hospital course from 12/3/18    Family History     None        Tobacco Use    Smoking status: Current Every Day Smoker     Packs/day: 1.00     Types: Cigarettes   Substance and Sexual Activity    Alcohol use: Yes     Comment: social    Drug use: No    Sexual activity: Yes     Partners: Female     Psychotherapeutics (From admission, onward)    Start     Stop Route Frequency Ordered    12/02/18 1100  ARIPiprazole tablet 10 mg      -- Oral Daily 12/02/18 1039    12/02/18 0217  ramelteon tablet 8 mg      -- Oral Nightly PRN 12/02/18 0117    11/30/18 1440  LORazepam tablet 2 mg      12/03 1439 Oral Every 4 hours PRN 11/30/18 1340    11/30/18 1115  lithium CR tablet 300 mg      -- Oral Every 12 hours 11/30/18 1014           Review of Systems  Objective:     Vital Signs (Most Recent):  Temp: 97.8 °F (36.6 °C) (12/03/18 0730)  Pulse: 104 (12/03/18 0730)  Resp: 18 (12/03/18 0730)  BP: 125/76 (12/03/18 0730) Vital Signs (24h Range):  Temp:  [97.8 °F (36.6 °C)-98.6 °F (37 °C)] 97.8 °F (36.6 °C)  Pulse:  [] 104  Resp:  [18] 18  BP: (119-125)/(68-76) 125/76     Height: 6' 2" (188 cm)  Weight: 64.6 kg (142 lb 6.7 oz)  Body mass index is 18.29 kg/m².    No intake or output data in the 24 hours ending 12/03/18 1006    Physical Exam   Mental Status Exam:  Appearance: age appropriate, casually dressed   Behavior: cooperative   Speech/Language: normal tone, normal rate, normal pitch, normal volume   Mood: "alright"   Affect: constricted    Thought Process:  goal-directed   Thought Content: Denies SI/HI or AH/VH   Orientation: grossly intact, person, place, situation   Cognition: grossly intact   Insight: fair   Judgment: fair          Significant Labs:   Last 24 Hours:   Recent Lab Results       12/03/18  0617        Immature Granulocytes 0.7     Immature Grans (Abs) 0.09  Comment:  Mild elevation in immature granulocytes is non specific and   can be seen in a variety of conditions including stress " response,   acute inflammation, trauma and pregnancy. Correlation with other   laboratory and clinical findings is essential.       Albumin 3.7     Alkaline Phosphatase 62     ALT 14     Anion Gap 8     AST 14     Baso # 0.06     Basophil% 0.4     Total Bilirubin 1.0  Comment:  For infants and newborns, interpretation of results should be based  on gestational age, weight and in agreement with clinical  observations.  Premature Infant recommended reference ranges:  Up to 24 hours.............<8.0 mg/dL  Up to 48 hours............<12.0 mg/dL  3-5 days..................<15.0 mg/dL  6-29 days.................<15.0 mg/dL       BUN, Bld 15     Calcium 9.7     Chloride 101     CO2 28     Creatinine 1.0     Differential Method Automated     eGFR if African American >60.0     eGFR if non  >60.0  Comment:  Calculation used to obtain the estimated glomerular filtration  rate (eGFR) is the CKD-EPI equation.        Eos # 0.3     Eosinophil% 2.5     Glucose 95     Gran # (ANC) 9.2     Gran% 68.4     Hematocrit 43.2     Hemoglobin 14.9     Lithium Lvl 0.4     Lymph # 2.5     Lymph% 18.6     MCH 31.2     MCHC 34.5     MCV 90     Mono # 1.3     Mono% 9.4     MPV 9.8     nRBC 0     Platelets 339     Potassium 4.0     Total Protein 7.1     RBC 4.78     RDW 12.4     Sodium 137     WBC 13.46           Significant Imaging: None

## 2018-12-03 NOTE — ASSESSMENT & PLAN NOTE
- Patient's wife notes hx of misuse of sedatives and opioids   - Utox + for benzos and opioids on admission  - Counseled on cessation and recommend substance abuse counseling as outpatient

## 2018-12-03 NOTE — ASSESSMENT & PLAN NOTE
- Continue Abilify 10 mg daily  - Continue Lithium 300 mg BID  - Patient's Lithium level from 12/3/18 was 0.4 mmol/L  - Will discontinue Strict Visual Precautions.    - Plan for family meeting with patient's wife tomorrow morning  - Consider possible discharge tomorrow morning.

## 2018-12-03 NOTE — PROGRESS NOTES
Group Psychotherapy (PhD/LCSW)    Site: [facility]    Clinical status of patient: Inpatient    Date: 12/3/2018    Group Focus: Life Skills    Length of service: 37806 - 35-40 minutes    Number of patients in attendance: 7    Referred by: Acute Psychiatry Unit Treatment Team    Target symptoms: Mood Disorder    Patient's response to treatment: Active Listening    Progress toward goals: Progressing slowly    Interval History: Pt appeared alert and attentive in group. Pt responded briefly when prompted to participate in a discussion of the value of daily rituals of awareness to support changes in behavior (mitigating negative ones; promoting positive ones)     Diagnosis: Bipolar d/o, depressed, in partial remission    Plan: Continue treatment on APU

## 2018-12-03 NOTE — ASSESSMENT & PLAN NOTE
- Per IM discharge summary:  - Per SLP pt on pureed diet  may advance diet as tolerated  - Continue PO clindamycin q 6 hours to complete 10 day course   - Continue Protonix 40 mg PO qd      - oral care with Vaseline to lip blisters

## 2018-12-03 NOTE — PLAN OF CARE
12/03/18 1600   [facility] Group Therapy   Group Name Education   Specific Interventions Coping Skills Training   Participation Level None   Participation Quality Refused

## 2018-12-03 NOTE — PLAN OF CARE
Problem: Patient Care Overview  Goal: Plan of Care Review  Outcome: Ongoing (interventions implemented as appropriate)  Observed awake and alert. Affect flat, mood calm and cooperative. Denied wanting to harm himself at this time and contracted for safety. Denied HI, A/V hallucinations. Took scheduled medications requesting Tylenol for mouth pain and Vistaril for sleep (see MAR). Pt appeared asleep throughout the remainder of the night as noted during frequent rounds. Free from fall/injury. Safety maintained. Continue 1:1 monitoring for pt safety.

## 2018-12-03 NOTE — PROGRESS NOTES
12/03/18 0900 12/03/18 1000 12/03/18 1100   [facility] Group Therapy   Group Name Community Reintegration Mental Awareness Education   Specific Interventions Current Events Cognitive Stimulation Training Guided Imagery/Relaxation   Participation Level Active;Appropriate Active;Appropriate;Attentive Active;Appropriate;Attentive   Participation Quality Cooperative;Social Cooperative;Social Cooperative;Social   Insight/Motivation Good Good Good   Affect/Mood Display Appropriate Appropriate Appropriate   Cognition Alert Alert Alert       12/03/18 1200   [facility] Group Therapy   Group Name Therapeutic Recreation   Specific Interventions Skilled Activity Crafts   Participation Level Active;Appropriate   Participation Quality Cooperative   Insight/Motivation Good   Affect/Mood Display Appropriate   Cognition Alert
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
## 2018-12-03 NOTE — PROGRESS NOTES
12/02/18 2000   [facility] Group Therapy   Group Name Community Reintegration   Specific Interventions Current Events   Participation Level Active;Appropriate   Participation Quality Cooperative   Insight/Motivation Good   Affect/Mood Display Appropriate   Cognition Alert;Oriented

## 2018-12-03 NOTE — PROGRESS NOTES
12/02/18 0930 12/02/18 1000 12/02/18 1100   [facility] Group Therapy   Group Name [facility] Mental Awareness Stress Management   Specific Interventions Current Events Cognitive Stimulation Training Guided Imagery/Relaxation   Participation Level Minimal;Appropriate Minimal;Appropriate Active   Participation Quality Cooperative;Requires Prompting Requires Prompting;Cooperative Cooperative   Insight/Motivation Improved Improved Good   Affect/Mood Display Appropriate Appropriate Appropriate   Cognition Alert;Oriented Alert;Oriented Oriented       12/02/18 1200 12/02/18 1230   [facility] Group Therapy   Group Name [facility] Therapeutic Recreation   Specific Interventions Social Skills Training Resocialization   Participation Level None Active;Supportive;Appropriate   Participation Quality Refused Cooperative;Social   Insight/Motivation --  Good   Affect/Mood Display --  Appropriate   Cognition --  Alert;Oriented
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
## 2018-12-04 LAB — VIT B1 BLD-MCNC: 61 UG/L (ref 38–122)

## 2018-12-04 PROCEDURE — 25000003 PHARM REV CODE 250: Performed by: NURSE PRACTITIONER

## 2018-12-04 PROCEDURE — 25000003 PHARM REV CODE 250: Performed by: PSYCHIATRY & NEUROLOGY

## 2018-12-04 PROCEDURE — 12400001 HC PSYCH SEMI-PRIVATE ROOM

## 2018-12-04 PROCEDURE — 90847 FAMILY PSYTX W/PT 50 MIN: CPT | Mod: AF,HB,, | Performed by: PSYCHIATRY & NEUROLOGY

## 2018-12-04 PROCEDURE — S4991 NICOTINE PATCH NONLEGEND: HCPCS | Performed by: PSYCHIATRY & NEUROLOGY

## 2018-12-04 PROCEDURE — 90853 GROUP PSYCHOTHERAPY: CPT | Mod: HP,HB,, | Performed by: PSYCHOLOGIST

## 2018-12-04 RX ADMIN — PANTOPRAZOLE SODIUM 40 MG: 40 TABLET, DELAYED RELEASE ORAL at 08:12

## 2018-12-04 RX ADMIN — CLINDAMYCIN HYDROCHLORIDE 450 MG: 150 CAPSULE ORAL at 11:12

## 2018-12-04 RX ADMIN — ACETAMINOPHEN 650 MG: 325 TABLET, FILM COATED ORAL at 05:12

## 2018-12-04 RX ADMIN — NICOTINE 1 PATCH: 7 PATCH, EXTENDED RELEASE TRANSDERMAL at 08:12

## 2018-12-04 RX ADMIN — CLINDAMYCIN HYDROCHLORIDE 450 MG: 150 CAPSULE ORAL at 05:12

## 2018-12-04 RX ADMIN — LITHIUM CARBONATE 300 MG: 300 TABLET, FILM COATED, EXTENDED RELEASE ORAL at 08:12

## 2018-12-04 RX ADMIN — ACETAMINOPHEN 650 MG: 325 TABLET, FILM COATED ORAL at 07:12

## 2018-12-04 RX ADMIN — ARIPIPRAZOLE 10 MG: 10 TABLET ORAL at 08:12

## 2018-12-04 RX ADMIN — Medication 5 ML: at 05:12

## 2018-12-04 RX ADMIN — Medication 5 ML: at 11:12

## 2018-12-04 RX ADMIN — HYDROXYZINE HYDROCHLORIDE 50 MG: 50 TABLET, FILM COATED ORAL at 08:12

## 2018-12-04 NOTE — PROGRESS NOTES
Group Psychotherapy (PhD/LCSW)    Site: [facility]    Clinical status of patient: Inpatient    Date: 12/4/2018    Group Focus: Life Skills    Length of service: 16929 - 35-40 minutes    Number of patients in attendance: 6    Referred by: Acute Psychiatry Unit Treatment Team    Target symptoms: Mood Disorder    Patient's response to treatment: Active Listening    Progress toward goals: Progressing slowly    Interval History: Pt appeared alert and attentive in group. Pt responded briefly, but appropriately when prompted in a discussion of coping with grief.      Diagnosis: Bipolar d/o, depressed, in partial remission    Plan: Continue treatment on [facility]

## 2018-12-04 NOTE — SUBJECTIVE & OBJECTIVE
"Interval History: see hospital course    Family History     None        Tobacco Use    Smoking status: Current Every Day Smoker     Packs/day: 1.00     Types: Cigarettes   Substance and Sexual Activity    Alcohol use: Yes     Comment: social    Drug use: No    Sexual activity: Yes     Partners: Female     Psychotherapeutics (From admission, onward)    Start     Stop Route Frequency Ordered    12/02/18 1100  ARIPiprazole tablet 10 mg      -- Oral Daily 12/02/18 1039    12/02/18 0217  ramelteon tablet 8 mg      -- Oral Nightly PRN 12/02/18 0117    11/30/18 1440  LORazepam tablet 2 mg      12/03 1439 Oral Every 4 hours PRN 11/30/18 1340    11/30/18 1115  lithium CR tablet 300 mg      -- Oral Every 12 hours 11/30/18 1014           Review of Systems   Constitutional: Negative for fever.   HENT: Negative for congestion.      Objective:     Vital Signs (Most Recent):  Temp: 99.2 °F (37.3 °C) (12/04/18 0758)  Pulse: 92 (12/04/18 0758)  Resp: 16 (12/04/18 0758)  BP: 120/60 (12/04/18 0758) Vital Signs (24h Range):  Temp:  [97.8 °F (36.6 °C)-99.2 °F (37.3 °C)] 99.2 °F (37.3 °C)  Pulse:  [80-92] 92  Resp:  [16-18] 16  BP: (115-132)/(60-75) 120/60     Height: 6' 2" (188 cm)  Weight: 64.6 kg (142 lb 6.7 oz)  Body mass index is 18.29 kg/m².    No intake or output data in the 24 hours ending 12/04/18 0914    Physical Exam   Constitutional: He appears well-developed and well-nourished. No distress.   HENT:   Head: Normocephalic and atraumatic.   Eyes: EOM are normal.   Neck: Normal range of motion.   Pulmonary/Chest: Effort normal.   Skin: He is not diaphoretic.     NEUROLOGICAL EXAMINATION:     CRANIAL NERVES     CN III, IV, VI   Extraocular motions are normal.       Appearance: age appropriate, casually dressed   Behavior: cooperative   Speech/Language: normal tone, normal rate, normal pitch, normal volume   Mood: "I'm ok"   Affect: constricted    Thought Process:  goal-directed, linear   Thought Content: Denies SI/HI or AH/VH "   Orientation: grossly intact, person, place, situation   Cognition: grossly intact   Insight: fair   Judgment: fair         Significant Labs:   Last 24 Hours:   Recent Lab Results     None          Significant Imaging: I have reviewed all pertinent imaging results/findings within the past 24 hours.

## 2018-12-04 NOTE — PROGRESS NOTES
12/04/18 0900 12/04/18 1000 12/04/18 1100   [facility] Group Therapy   Group Name Community Reintegration Mental Awareness Education   Specific Interventions Current Events Cognitive Stimulation Training Guided Imagery/Relaxation   Participation Level Active;Appropriate Active;Appropriate Active;Appropriate;Attentive   Participation Quality Cooperative Cooperative Cooperative;Social   Insight/Motivation Good Good Good   Affect/Mood Display Appropriate Appropriate Appropriate   Cognition Alert Alert Alert       12/04/18 1400   [facility] Group Therapy   Group Name Therapeutic Recreation   Specific Interventions Guided Imagery/Relaxation   Participation Level Active;Appropriate;Attentive   Participation Quality Cooperative;Social   Insight/Motivation Good   Affect/Mood Display Appropriate   Cognition Alert
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
## 2018-12-04 NOTE — PROGRESS NOTES
12/03/18 2000   [facility] Group Therapy   Group Name Mental Awareness   Specific Interventions Skilled Activity Self-Expression  (emotion exercise)   Participation Level Active;Appropriate   Participation Quality Cooperative   Insight/Motivation Good   Affect/Mood Display Appropriate   Cognition Alert;Oriented

## 2018-12-04 NOTE — ASSESSMENT & PLAN NOTE
- Continue Abilify 10 mg daily  - Continue Lithium 300 mg BID  - Patient's Lithium level from 12/3/18 was 0.4 mmol/L. Redraw scheduled for 12/5 AM prior to planned discharge, along with CMP. <--- F/U  - Will discontinue Strict Visual Precautions.    - family meeting held with wife 12/4 - see corresponding hospital course  - Consider possible discharge tomorrow morning.

## 2018-12-04 NOTE — PLAN OF CARE
12/03/18 1630   [facility] Group Therapy   Group Name Medication   Participation Level None   Participation Quality Refused

## 2018-12-04 NOTE — PLAN OF CARE
Problem: Patient Care Overview (Adult)  Goal: Plan of Care Review  Outcome: Ongoing (interventions implemented as appropriate)  Calm, cooperative during the evening. Currently does not endorse feelings of depression or suicidal ideations. Visible in milieu. Interacting with peers and staff appropriately. Continues with complaints of mouth pain. Pain management plan continued with oral medications. Remains with sleep impairment. Vistaril administered upon patient request to aid with insomnia. 3 hours of sleep observed throughout the night. Suicide precautions and modified visual contact maintained per MD orders.

## 2018-12-04 NOTE — PLAN OF CARE
12/04/18 1500   [facility] Group Therapy   Group Name Other   Specific Interventions Other (see comments)  (SW/RN discharge planning)   Participation Level Appropriate   Participation Quality Cooperative   Insight/Motivation Improved   Affect/Mood Display Appropriate   Cognition Alert

## 2018-12-04 NOTE — PLAN OF CARE
Problem: Patient Care Overview (Adult)  Goal: Plan of Care Review  Outcome: Ongoing (interventions implemented as appropriate)  Patient calm and cooperative. Denies SI/HI/AVH. Mood good Interactive with peers. Meet with treatment and wife. Discussed medications, follow up and discharge treatment plan. Verbalized understanding. Med compliant. Tylenol given for mouth discomfort.

## 2018-12-05 VITALS
BODY MASS INDEX: 18.28 KG/M2 | SYSTOLIC BLOOD PRESSURE: 128 MMHG | TEMPERATURE: 98 F | HEART RATE: 84 BPM | DIASTOLIC BLOOD PRESSURE: 82 MMHG | WEIGHT: 142.44 LBS | HEIGHT: 74 IN | RESPIRATION RATE: 16 BRPM

## 2018-12-05 PROBLEM — R45.851 SUICIDAL IDEATIONS: Status: RESOLVED | Noted: 2018-11-29 | Resolved: 2018-12-05

## 2018-12-05 PROBLEM — R45.851 SUICIDAL IDEATION: Status: RESOLVED | Noted: 2018-11-29 | Resolved: 2018-12-05

## 2018-12-05 PROBLEM — T54.3X2A: Status: RESOLVED | Noted: 2018-11-27 | Resolved: 2018-12-05

## 2018-12-05 LAB
ALBUMIN SERPL BCP-MCNC: 3.7 G/DL
ALP SERPL-CCNC: 72 U/L
ALT SERPL W/O P-5'-P-CCNC: 18 U/L
ANION GAP SERPL CALC-SCNC: 8 MMOL/L
AST SERPL-CCNC: 19 U/L
BILIRUB SERPL-MCNC: 0.9 MG/DL
BUN SERPL-MCNC: 16 MG/DL
CALCIUM SERPL-MCNC: 9.9 MG/DL
CHLORIDE SERPL-SCNC: 103 MMOL/L
CO2 SERPL-SCNC: 28 MMOL/L
CREAT SERPL-MCNC: 1 MG/DL
EST. GFR  (AFRICAN AMERICAN): >60 ML/MIN/1.73 M^2
EST. GFR  (NON AFRICAN AMERICAN): >60 ML/MIN/1.73 M^2
GLUCOSE SERPL-MCNC: 87 MG/DL
LITHIUM SERPL-SCNC: 0.4 MMOL/L
POTASSIUM SERPL-SCNC: 4.4 MMOL/L
PROT SERPL-MCNC: 7.4 G/DL
SODIUM SERPL-SCNC: 139 MMOL/L

## 2018-12-05 PROCEDURE — 99238 HOSP IP/OBS DSCHRG MGMT 30/<: CPT | Mod: AF,HB,, | Performed by: PSYCHIATRY & NEUROLOGY

## 2018-12-05 PROCEDURE — 90853 GROUP PSYCHOTHERAPY: CPT | Mod: HP,HB,, | Performed by: PSYCHOLOGIST

## 2018-12-05 PROCEDURE — 80053 COMPREHEN METABOLIC PANEL: CPT

## 2018-12-05 PROCEDURE — S4991 NICOTINE PATCH NONLEGEND: HCPCS | Performed by: PSYCHIATRY & NEUROLOGY

## 2018-12-05 PROCEDURE — 25000003 PHARM REV CODE 250: Performed by: NURSE PRACTITIONER

## 2018-12-05 PROCEDURE — 80178 ASSAY OF LITHIUM: CPT

## 2018-12-05 PROCEDURE — 25000003 PHARM REV CODE 250: Performed by: PSYCHIATRY & NEUROLOGY

## 2018-12-05 PROCEDURE — 36415 COLL VENOUS BLD VENIPUNCTURE: CPT

## 2018-12-05 RX ORDER — NICOTINE 7MG/24HR
1 PATCH, TRANSDERMAL 24 HOURS TRANSDERMAL DAILY
Qty: 14 PATCH | Refills: 0 | OUTPATIENT
Start: 2018-12-06

## 2018-12-05 RX ORDER — LITHIUM CARBONATE 300 MG/1
300 TABLET, FILM COATED, EXTENDED RELEASE ORAL EVERY 12 HOURS
Qty: 60 TABLET | Refills: 0 | Status: SHIPPED | OUTPATIENT
Start: 2018-12-05 | End: 2019-08-10

## 2018-12-05 RX ORDER — CLINDAMYCIN HYDROCHLORIDE 150 MG/1
450 CAPSULE ORAL EVERY 6 HOURS
Qty: 18 CAPSULE | Refills: 0 | Status: SHIPPED | OUTPATIENT
Start: 2018-12-05 | End: 2019-05-30

## 2018-12-05 RX ORDER — CLINDAMYCIN HYDROCHLORIDE 150 MG/1
450 CAPSULE ORAL EVERY 6 HOURS
Qty: 27 CAPSULE | Refills: 0 | OUTPATIENT
Start: 2018-12-05 | End: 2018-12-08

## 2018-12-05 RX ORDER — LITHIUM CARBONATE 300 MG/1
300 TABLET, FILM COATED, EXTENDED RELEASE ORAL EVERY 12 HOURS
Qty: 60 TABLET | Refills: 0 | OUTPATIENT
Start: 2018-12-05 | End: 2019-01-04

## 2018-12-05 RX ORDER — NICOTINE 7MG/24HR
1 PATCH, TRANSDERMAL 24 HOURS TRANSDERMAL DAILY
Qty: 30 PATCH | Refills: 0 | Status: SHIPPED | OUTPATIENT
Start: 2018-12-06 | End: 2019-05-30

## 2018-12-05 RX ORDER — ARIPIPRAZOLE 10 MG/1
10 TABLET ORAL DAILY
Qty: 30 TABLET | Refills: 0 | Status: SHIPPED | OUTPATIENT
Start: 2018-12-06 | End: 2019-05-30

## 2018-12-05 RX ORDER — PANTOPRAZOLE SODIUM 40 MG/1
40 TABLET, DELAYED RELEASE ORAL DAILY
Qty: 30 TABLET | Refills: 0 | Status: CANCELLED | OUTPATIENT
Start: 2018-12-06 | End: 2019-01-05

## 2018-12-05 RX ORDER — ARIPIPRAZOLE 10 MG/1
10 TABLET ORAL DAILY
Qty: 30 TABLET | Refills: 0 | OUTPATIENT
Start: 2018-12-06 | End: 2019-01-05

## 2018-12-05 RX ADMIN — NICOTINE 1 PATCH: 7 PATCH, EXTENDED RELEASE TRANSDERMAL at 08:12

## 2018-12-05 RX ADMIN — ARIPIPRAZOLE 10 MG: 10 TABLET ORAL at 08:12

## 2018-12-05 RX ADMIN — CLINDAMYCIN HYDROCHLORIDE 450 MG: 150 CAPSULE ORAL at 12:12

## 2018-12-05 RX ADMIN — Medication 5 ML: at 08:12

## 2018-12-05 RX ADMIN — PANTOPRAZOLE SODIUM 40 MG: 40 TABLET, DELAYED RELEASE ORAL at 08:12

## 2018-12-05 RX ADMIN — LITHIUM CARBONATE 300 MG: 300 TABLET, FILM COATED, EXTENDED RELEASE ORAL at 08:12

## 2018-12-05 RX ADMIN — CLINDAMYCIN HYDROCHLORIDE 450 MG: 150 CAPSULE ORAL at 05:12

## 2018-12-05 NOTE — PROGRESS NOTES
Group Psychotherapy (PhD/LCSW)    Site: [facility]    Clinical status of patient: Inpatient    Date: 12/5/2018    Group Focus: Life Skills    Length of service: 50523 - 35-40 minutes    Number of patients in attendance: 7    Referred by: Acute Psychiatry Unit Treatment Team    Target symptoms: Mood Disorder    Patient's response to treatment: Active Listening    Progress toward goals: Progressing slowly    Interval History: Pt appeared alert and attentive in group. Pt responded appropriately when prompted in a discussion of the danger of expectations when they are either too low or too high.    Diagnosis: Bipolar d/o, depressed, in partial remission    Plan: See Discharge Summary dated 12/5/18
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
## 2018-12-05 NOTE — PLAN OF CARE
Problem: Patient Care Overview  Goal: Plan of Care Review  Outcome: Ongoing (interventions implemented as appropriate)  Observed awake and alert ambulating unit. Affect flat,mood calm and cooperative.Denied SI/HI, A/V hallucinations. Interacting appropriately with peers. Took scheduled medications, requesting Vistaril for insomnia and Tylenol for mouth pain. Pt. appeared asleep as noted during frequent rounds. Free from falls/injury. Safety maintained. Anticipating discharge to home soon. Continue to monitor.

## 2018-12-05 NOTE — PSYCH
Patient calm and cooperative. Denies SI/HI/AVH. Mood good. Med compliant. Meet with treatment team. Dr. Rosado discussed medications, follow up and discharge home instructions. Verbalized understanding. Prescriptions and copy of discharge home instructions given to patient. Discharge ambulatory alone.

## 2018-12-13 ENCOUNTER — TELEPHONE (OUTPATIENT)
Dept: PHARMACY | Facility: CLINIC | Age: 43
End: 2018-12-13

## 2018-12-20 ENCOUNTER — HOSPITAL ENCOUNTER (EMERGENCY)
Facility: HOSPITAL | Age: 43
Discharge: HOME | End: 2018-12-20
Attending: EMERGENCY MEDICINE
Payer: MEDICAID

## 2018-12-20 VITALS
OXYGEN SATURATION: 96 % | SYSTOLIC BLOOD PRESSURE: 128 MMHG | TEMPERATURE: 98 F | DIASTOLIC BLOOD PRESSURE: 70 MMHG | RESPIRATION RATE: 18 BRPM | HEART RATE: 78 BPM | WEIGHT: 165 LBS | BODY MASS INDEX: 21.18 KG/M2

## 2018-12-20 DIAGNOSIS — K12.30 MUCOSITIS: Primary | ICD-10-CM

## 2018-12-20 PROCEDURE — 99284 EMERGENCY DEPT VISIT MOD MDM: CPT | Mod: ,,, | Performed by: EMERGENCY MEDICINE

## 2018-12-20 PROCEDURE — 99283 EMERGENCY DEPT VISIT LOW MDM: CPT

## 2018-12-20 RX ORDER — CLINDAMYCIN HYDROCHLORIDE 150 MG/1
300 CAPSULE ORAL 4 TIMES DAILY
Qty: 56 CAPSULE | Refills: 0 | Status: SHIPPED | OUTPATIENT
Start: 2018-12-20 | End: 2018-12-27
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
## 2018-12-21 NOTE — DISCHARGE INSTRUCTIONS
Return to the ER immediately if you are unable to open her mouth, have painful swallowing, develop a fever greater than 100.4°, or if your symptoms worsen.

## 2018-12-21 NOTE — ED TRIAGE NOTES
Patient reports to the ED with reports of a mouth infection. Patient family states that patient attempted suicide in November by drinking . Patient denies SI/HI. Patient states that it is difficulty to talk and swallow.

## 2019-01-10 ENCOUNTER — INITIAL CONSULT (OUTPATIENT)
Dept: OTOLARYNGOLOGY | Facility: CLINIC | Age: 44
End: 2019-01-10
Payer: MEDICAID

## 2019-01-10 VITALS
TEMPERATURE: 99 F | WEIGHT: 151 LBS | DIASTOLIC BLOOD PRESSURE: 70 MMHG | BODY MASS INDEX: 19.39 KG/M2 | HEART RATE: 58 BPM | SYSTOLIC BLOOD PRESSURE: 124 MMHG

## 2019-01-10 DIAGNOSIS — S09.93XD INJURY OF MOUTH, SUBSEQUENT ENCOUNTER: ICD-10-CM

## 2019-01-10 DIAGNOSIS — Q38.1 ANKYLOGLOSSIA: Primary | ICD-10-CM

## 2019-01-10 DIAGNOSIS — T54.3X2D: ICD-10-CM

## 2019-01-10 PROCEDURE — 99999 PR PBB SHADOW E&M-EST. PATIENT-LVL III: CPT | Mod: PBBFAC,,, | Performed by: OTOLARYNGOLOGY

## 2019-01-10 PROCEDURE — 99213 OFFICE O/P EST LOW 20 MIN: CPT | Mod: S$PBB,,, | Performed by: OTOLARYNGOLOGY

## 2019-01-10 PROCEDURE — 99213 OFFICE O/P EST LOW 20 MIN: CPT | Mod: PBBFAC | Performed by: OTOLARYNGOLOGY

## 2019-01-10 PROCEDURE — 99213 PR OFFICE/OUTPT VISIT, EST, LEVL III, 20-29 MIN: ICD-10-PCS | Mod: S$PBB,,, | Performed by: OTOLARYNGOLOGY

## 2019-01-10 PROCEDURE — 99999 PR PBB SHADOW E&M-EST. PATIENT-LVL III: ICD-10-PCS | Mod: PBBFAC,,, | Performed by: OTOLARYNGOLOGY

## 2019-01-10 NOTE — LETTER
January 13, 2019      Margarito Loera MD  [address]  [facility] [address]           [facility]  [address]  [facility] [address]  Phone: [phone]  Fax: [phone]          Patient: Demarco Ba   MR Number: [id number]   YOB: 1975   Date of Visit: 1/10/2019       Dear Dr. Margarito Loera:    Thank you for referring Demarco Ba to me for evaluation. Attached you will find relevant portions of my assessment and plan of care.    If you have questions, please do not hesitate to call me. I look forward to following Demarco Ba along with you.    Sincerely,    Andrew Maya MD    Enclosure  CC:  No Recipients    If you would like to receive this communication electronically, please contact [email or web address] or [phone] to request more information on [employer] access.    For providers and/or their staff who would like to refer a patient to [facility], please contact us through our one-stop-shop provider referral line, [facility], at [phone].    If you feel you have received this communication in error or would no longer like to receive these types of communications, please e-mail [email or web address]

## 2019-01-13 PROBLEM — Q38.1 ANKYLOGLOSSIA: Status: ACTIVE | Noted: 2019-01-13

## 2019-01-13 NOTE — ASSESSMENT & PLAN NOTE
Healing well.  Reassured.  His tongue is tethered by extensive scar of the FOM.  I explained that the most definitive solution to this problem is resection of the scar and free flap reconstruction of the defect.  I also explained that such a solution is a last resort.  I will refer him to speech therapy for attempted conservative management.  He is to RTC after seeing Ms. Lopez in speech.
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
## 2019-01-16 ENCOUNTER — CLINICAL SUPPORT (OUTPATIENT)
Dept: SPEECH THERAPY | Facility: HOSPITAL | Age: 44
End: 2019-01-16
Attending: OTOLARYNGOLOGY
Payer: MEDICAID

## 2019-01-16 DIAGNOSIS — R47.1 DYSARTHRIA: Primary | ICD-10-CM

## 2019-01-16 DIAGNOSIS — T54.3X2A: ICD-10-CM
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
## 2019-01-16 NOTE — PATIENT INSTRUCTIONS
Lower lip:  1.  Do a scar stretch along the outside where your chin meets your gum:   A.  Back and forth   B.  Up and down   C.  Twisty in both directions   Hold each a few seconds; repeat all 5-10 times.  2.  Pull on your lower lip just until you feel a stretch; hold 5-6 seconds, relax and repeat.  Do 10.    Beneath tongue/floor of mouth:  1.  Push into tongue where tethered area meets the tongue in the middle and one each side.  2.  Put you thumb under the jaw (in the soft area) and put your middle finger in the floor of mouth and pretend you are trying to make them meet.  Do little circles with your middle finger.  Move around to different spots on the floor of mouth.    Pulling the tongue:  Use gauze to  the tongue.  1.   on top and below the tip and pull up until you feel a stretch.  2.   on the side (it will make the tongue bunch up); pull out until you feel a stretch.  Try to hold these 20-30 seconds.  Do 5-10.    Active tongue movements:  Perform 10 times, 3-4 times per day.  ·   · Stick your tongue out as far as possible.  Do not help with your lips.  · Move your tongue to each corner of your mouth.  Hold for 5 seconds at the farthest point you can move it.  · Put your tongue tip up behind your top teeth and push against the palate (the spot where your burn your mouth when you eat pizza).  Hold 5 seconds.  · Trace the inside of your upper arches with your tongue tip.  Hold at the farthest point you can reach on each side.  · With your mouth open, suck your tongue to the roof of your mouth and rapidly pull it down to make a sound.
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
## 2024-03-17 NOTE — PROGRESS NOTES
"[facility]  Psychiatry  Progress Note    Patient Name: Demarco Ba  MRN: [id number]   Code Status: Full Code  Admission Date: 11/29/2018  Hospital Length of Stay: 3 days  Expected Discharge Date:   Attending Physician: Demarco Rosado Jr., MD  Primary Care Provider: Kendall Myles MD    [place] [facility]    Patient information was obtained from patient and ER records.     Subjective:     Principal Problem:Bipolar 1 disorder    Hospital Course: 11/30/2018  Met with treatment team. Calm and cooperative. Patient reports he got in an argument with his wife over not taking his medications - lithium 300 mg PO BID and latuda. Says he could not afford the latuda and stopped the lithium because he needed to attend too many appointments to stay on treatment. Of his drinking  says he "just wasn't thinking" and is happy his children were sleeping.  Says that his current mood is "better."     Patient reports he was diagnosed with bipolar disorder at he age of 14 and reports he has been hospitalized 17 times. Endorses a hx of one previous suicide attempt by slitting his wrists. Has been on lithium therapy for the past 2 years and feels he does well just on lithium monotherapy. Denies side effects from Abilify he is currently taking.     Patient denies suicidal ideation at present. Agrees to inform staff of any emergence of thoughts of self-harm. He denies access to firearm at home.     Attending psychiatrist Dr. Hodge cared for patient on medical floor and discussed case with patient's wife. Per attestation 11/30, patient's wife reports he  has hx of ~18 previous psychiatric admissions nearly all for suicidal ideation as well as a history of several suicide attempts/gestures. Last summer he overdosed on seroquel and claimed it was accidental. Further, before this admission, wife says that patient did express SI prior to impulsively grabbing the bottle of lye and ingesting " Discharge instructions were given to the patient by sherry.     The patient left the Emergency Department ambulatory, alert and oriented and in no acute distress with 2 prescriptions. The patient was encouraged to call or return to the ED for worsening issues or problems and was encouraged to schedule a follow up appointment for continuing care.     The patient verbalized understanding of discharge instructions and prescriptions, all questions were answered. The patient has no further concerns at this time.      "it. She notes he has a hx of minimizing his symptoms to providers to be discharged from the hospital and reports that he has expressed active suicidal ideation to her during this hospitalization. She also notes that pt has a hx of abusing opioids and sedatives.     12/1/2018   Patient calm and cooperative with interview; demonstrates constricted affect . Mood is "better" and attributes this to his wife visiting last night. Denies SI, HI, AVH. Patient has been medication compliant, but reports increased urination - encouraged to hydrate frequently. Received hydroxyzine prn insomnia last night. Reports sleeping well, appetite improved since "I can finally eat now". No somatic complaints, no other complaints during interview. Will f/u on EKG tomorrow AM.    12/2/2018   Patient calm and cooperative with interview. States he had improved sleep last night despite mouth pain. Mood is "better". States regrets of previous suicide attempt. Reports feeling more calm which he attributes to H[place]. Patient is Denies SI, HI, AVH. Patient has been medication compliant, no medication side-effects reported. Received hydroxyzine 50 mg and ramelteon 8 mg in the past 24 hours. Reports sleeping and eating well.     Interval History: see hospital course     Family History     None        Tobacco Use    Smoking status: Current Every Day Smoker     Packs/day: 1.00     Types: Cigarettes   Substance and Sexual Activity    Alcohol use: Yes     Comment: social    Drug use: No    Sexual activity: Yes     Partners: Female     Psychotherapeutics (From admission, onward)    Start     Stop Route Frequency Ordered    12/02/18 0217  ramelteon tablet 8 mg      -- Oral Nightly PRN 12/02/18 0117    11/30/18 2100  ARIPiprazole tablet 5 mg      -- Oral Nightly 11/30/18 1020    11/30/18 1440  LORazepam tablet 2 mg      12/03 1439 Oral Every 4 hours PRN 11/30/18 1340    11/30/18 1115  lithium CR tablet 300 mg      -- Oral Every 12 hours 11/30/18 1014 " "          Review of Systems   Constitutional: Negative for fever.   HENT: Positive for sore throat.         From ingestion of    Gastrointestinal: Negative for abdominal pain, blood in stool and constipation.     Objective:     Vital Signs (Most Recent):  Temp: 97.8 °F (36.6 °C) (12/02/18 0752)  Pulse: 76 (12/02/18 0752)  Resp: 16 (12/02/18 0752)  BP: 116/67 (12/02/18 0752) Vital Signs (24h Range):  Temp:  [97.8 °F (36.6 °C)-98.1 °F (36.7 °C)] 97.8 °F (36.6 °C)  Pulse:  [76-93] 76  Resp:  [16] 16  BP: (116-130)/(67-70) 116/67     Height: 6' 2" (188 cm)  Weight: 64.6 kg (142 lb 6.7 oz)  Body mass index is 18.29 kg/m².    No intake or output data in the 24 hours ending 12/02/18 0813    Physical Exam          Mental Status Exam:  Appearance: unremarkable, age appropriate, thin, tattoed   Level of Consciousness: alert, awake  Behavior/Cooperation: normal, cooperative  Psychomotor: unremarkable   Speech: normal tone, normal rate, normal pitch, normal volume  Language: english, fluid  Orientation: grossly intact  Attention Span/Concentration: intact  Memory: grossly intact  Mood: "better"  Affect: constricted  Thought Process: normal and logical  Associations: normal and logical  Thought Content: normal, no suicidality, no homicidality, delusions, or paranoia  Perceptual Disturbances: no auditory and/or visual hallucinations  Insight: limited  Judgment: fair    Significant Labs:   Last 24 Hours:   Recent Lab Results     None          Significant Imaging:  CXR 11/27/18:   No significant abnormality identified.    Assessment/Plan:     * Bipolar 1 disorder    - Patient presented after suicide gesture vs attempt by ingesting  after her had an argument with his wife. Endorses hx of bipolar disorder with 17 hospital admissions. Recently medication non-compliant 2/2 financial issues and time constraints but reports previously being well controlled on lithium   - Denies current suicidal ideation and " reports he would inform staff should he experience thoughts of self harm  As documented in attestation by Dr. Hodge 11/30 pt with hx of minimizing sxs and has made active suicidal statements while admitted. Continue  SVC out of caution and reduce observation statu once patient demonstrates ability to maintain safety on unit   - Increase Abilify 5 mg po qhs to 10 mg po q am. Patient no longer complaining of drowsiness.  - Continue Lithium 300 mg PO BID   - Will draw lithium level, CMP CBC 12/3 am   - Other Labs: TSH 0.382, Free T4 1.23, Free T3 2.9, RPR non-reactive, HIV (-), Hepatitis panel wnl, B12 759, thiamine, Vitamin D 17, folate 6.2  -  EKG QTc 405, NSR      Benzodiazepine misuse    - Patient's wife notes hx of misuse of sedatives and opioids   - Utox + for benzos and opioids on admission  - Will monitor vitals q4 hrs   - PRN ativan 2 mg for 2 or more of the following: SBP>160, DBP >100, HR>100, tremor, sweating.      Lye ingestion, intentional self-harm, initial encounter    - Per IM discharge summary:  - Per SLP pt on pureed diet  may advance diet as tolerated  - Continue PO clindamycin q 6 hours to complete 10 day course   - Continue Protonix 40 mg PO qd    - Continue pain management with Oxycodone 5 mg q4 hrs PRN   - oral care with Vaseline to lip blisters               Need for Continued Hospitalization:   Psychiatric illness continues to pose a potential threat to life or bodily function, of self or others, thereby requiring the need for continued inpatient psychiatric hospitalization., Protective inpatient psychiatric hospitalization required while a safe disposition plan is enacted. and Requires ongoing hospitalization for stabilization of medications.    Anticipated Disposition: Home or Self Care     Total time:  15 with greater than 50% of this time spent in counseling and/or coordination of care.       Konrad Hicks, DO   Psychiatry  [facility]